# Patient Record
Sex: FEMALE | Race: WHITE | NOT HISPANIC OR LATINO | Employment: OTHER | URBAN - NONMETROPOLITAN AREA
[De-identification: names, ages, dates, MRNs, and addresses within clinical notes are randomized per-mention and may not be internally consistent; named-entity substitution may affect disease eponyms.]

---

## 2019-01-18 ENCOUNTER — HOSPITAL ENCOUNTER (EMERGENCY)
Facility: HOSPITAL | Age: 54
Discharge: HOME | End: 2019-01-19
Attending: EMERGENCY MEDICINE | Admitting: EMERGENCY MEDICINE

## 2019-01-18 ENCOUNTER — APPOINTMENT (OUTPATIENT)
Dept: GENERAL RADIOLOGY | Facility: HOSPITAL | Age: 54
End: 2019-01-18

## 2019-01-18 DIAGNOSIS — J18.9 PNEUMONIA OF RIGHT MIDDLE LOBE DUE TO INFECTIOUS ORGANISM: Primary | ICD-10-CM

## 2019-01-18 PROCEDURE — 93005 ELECTROCARDIOGRAM TRACING: CPT | Performed by: EMERGENCY MEDICINE

## 2019-01-18 PROCEDURE — 71046 X-RAY EXAM CHEST 2 VIEWS: CPT

## 2019-01-18 PROCEDURE — 93010 ELECTROCARDIOGRAM REPORT: CPT | Performed by: INTERNAL MEDICINE

## 2019-01-18 PROCEDURE — 99284 EMERGENCY DEPT VISIT MOD MDM: CPT

## 2019-01-18 RX ORDER — IPRATROPIUM BROMIDE AND ALBUTEROL SULFATE 2.5; .5 MG/3ML; MG/3ML
3 SOLUTION RESPIRATORY (INHALATION) ONCE
Status: COMPLETED | OUTPATIENT
Start: 2019-01-18 | End: 2019-01-19

## 2019-01-18 RX ORDER — ATORVASTATIN CALCIUM 20 MG/1
20 TABLET, FILM COATED ORAL DAILY
COMMUNITY
End: 2022-03-08 | Stop reason: DRUGHIGH

## 2019-01-18 RX ORDER — ASPIRIN 81 MG/1
81 TABLET, CHEWABLE ORAL DAILY
COMMUNITY

## 2019-01-18 RX ORDER — SODIUM CHLORIDE 0.9 % (FLUSH) 0.9 %
10 SYRINGE (ML) INJECTION AS NEEDED
Status: DISCONTINUED | OUTPATIENT
Start: 2019-01-18 | End: 2019-01-19 | Stop reason: HOSPADM

## 2019-01-18 RX ORDER — ISOSORBIDE MONONITRATE 30 MG/1
30 TABLET, EXTENDED RELEASE ORAL DAILY
COMMUNITY
Start: 2018-11-26 | End: 2019-11-27

## 2019-01-18 RX ORDER — ENALAPRIL MALEATE 2.5 MG/1
2.5 TABLET ORAL DAILY
COMMUNITY
Start: 2013-01-08 | End: 2021-02-04

## 2019-01-18 RX ORDER — METHYLPREDNISOLONE SODIUM SUCCINATE 125 MG/2ML
125 INJECTION, POWDER, LYOPHILIZED, FOR SOLUTION INTRAMUSCULAR; INTRAVENOUS ONCE
Status: COMPLETED | OUTPATIENT
Start: 2019-01-18 | End: 2019-01-19

## 2019-01-19 VITALS
OXYGEN SATURATION: 95 % | DIASTOLIC BLOOD PRESSURE: 74 MMHG | TEMPERATURE: 98.4 F | WEIGHT: 80.3 LBS | BODY MASS INDEX: 14.78 KG/M2 | SYSTOLIC BLOOD PRESSURE: 138 MMHG | HEART RATE: 88 BPM | HEIGHT: 62 IN | RESPIRATION RATE: 20 BRPM

## 2019-01-19 LAB
ALBUMIN SERPL-MCNC: 3.9 G/DL (ref 3.4–4.8)
ALBUMIN/GLOB SERPL: 1.6 G/DL (ref 1.1–1.8)
ALP SERPL-CCNC: 130 U/L (ref 38–126)
ALT SERPL W P-5'-P-CCNC: 23 U/L (ref 9–52)
ANION GAP SERPL CALCULATED.3IONS-SCNC: 4 MMOL/L (ref 5–15)
AST SERPL-CCNC: 27 U/L (ref 14–36)
BASOPHILS # BLD AUTO: 0.02 10*3/MM3 (ref 0–0.2)
BASOPHILS NFR BLD AUTO: 0.2 % (ref 0–2)
BILIRUB SERPL-MCNC: 0.9 MG/DL (ref 0.2–1.3)
BUN BLD-MCNC: 5 MG/DL (ref 7–21)
BUN/CREAT SERPL: 11.4 (ref 7–25)
CALCIUM SPEC-SCNC: 9.4 MG/DL (ref 8.4–10.2)
CHLORIDE SERPL-SCNC: 103 MMOL/L (ref 95–110)
CO2 SERPL-SCNC: 24 MMOL/L (ref 22–31)
CREAT BLD-MCNC: 0.44 MG/DL (ref 0.5–1)
DEPRECATED RDW RBC AUTO: 41.7 FL (ref 36.4–46.3)
EOSINOPHIL # BLD AUTO: 0.03 10*3/MM3 (ref 0–0.7)
EOSINOPHIL NFR BLD AUTO: 0.2 % (ref 0–7)
ERYTHROCYTE [DISTWIDTH] IN BLOOD BY AUTOMATED COUNT: 12.3 % (ref 11.5–14.5)
FLUAV AG NPH QL: NEGATIVE
FLUBV AG NPH QL IA: NEGATIVE
GFR SERPL CREATININE-BSD FRML MDRD: 150 ML/MIN/1.73 (ref 51–120)
GLOBULIN UR ELPH-MCNC: 2.5 GM/DL (ref 2.3–3.5)
GLUCOSE BLD-MCNC: 105 MG/DL (ref 60–100)
HCT VFR BLD AUTO: 36.3 % (ref 35–45)
HGB BLD-MCNC: 12.4 G/DL (ref 12–15.5)
HOLD SPECIMEN: NORMAL
HOLD SPECIMEN: NORMAL
IMM GRANULOCYTES # BLD AUTO: 0.04 10*3/MM3 (ref 0–0.02)
IMM GRANULOCYTES NFR BLD AUTO: 0.3 % (ref 0–0.5)
LYMPHOCYTES # BLD AUTO: 2.3 10*3/MM3 (ref 0.6–4.2)
LYMPHOCYTES NFR BLD AUTO: 17.3 % (ref 10–50)
MCH RBC QN AUTO: 31.6 PG (ref 26.5–34)
MCHC RBC AUTO-ENTMCNC: 34.2 G/DL (ref 31.4–36)
MCV RBC AUTO: 92.6 FL (ref 80–98)
MONOCYTES # BLD AUTO: 0.88 10*3/MM3 (ref 0–0.9)
MONOCYTES NFR BLD AUTO: 6.6 % (ref 0–12)
NEUTROPHILS # BLD AUTO: 10 10*3/MM3 (ref 2–8.6)
NEUTROPHILS NFR BLD AUTO: 75.4 % (ref 37–80)
NT-PROBNP SERPL-MCNC: 1110 PG/ML (ref 0–900)
PLATELET # BLD AUTO: 167 10*3/MM3 (ref 150–450)
PMV BLD AUTO: 10.3 FL (ref 8–12)
POTASSIUM BLD-SCNC: 3 MMOL/L (ref 3.5–5.1)
PROT SERPL-MCNC: 6.4 G/DL (ref 6.3–8.6)
RBC # BLD AUTO: 3.92 10*6/MM3 (ref 3.77–5.16)
SODIUM BLD-SCNC: 131 MMOL/L (ref 137–145)
TROPONIN I SERPL-MCNC: <0.012 NG/ML
WBC NRBC COR # BLD: 13.27 10*3/MM3 (ref 3.2–9.8)
WHOLE BLOOD HOLD SPECIMEN: NORMAL
WHOLE BLOOD HOLD SPECIMEN: NORMAL

## 2019-01-19 PROCEDURE — 85025 COMPLETE CBC W/AUTO DIFF WBC: CPT | Performed by: EMERGENCY MEDICINE

## 2019-01-19 PROCEDURE — 87804 INFLUENZA ASSAY W/OPTIC: CPT | Performed by: EMERGENCY MEDICINE

## 2019-01-19 PROCEDURE — 94640 AIRWAY INHALATION TREATMENT: CPT

## 2019-01-19 PROCEDURE — 94760 N-INVAS EAR/PLS OXIMETRY 1: CPT

## 2019-01-19 PROCEDURE — 87040 BLOOD CULTURE FOR BACTERIA: CPT | Performed by: EMERGENCY MEDICINE

## 2019-01-19 PROCEDURE — 25010000002 LEVOFLOXACIN PER 250 MG: Performed by: EMERGENCY MEDICINE

## 2019-01-19 PROCEDURE — 83880 ASSAY OF NATRIURETIC PEPTIDE: CPT | Performed by: EMERGENCY MEDICINE

## 2019-01-19 PROCEDURE — 96365 THER/PROPH/DIAG IV INF INIT: CPT

## 2019-01-19 PROCEDURE — 84484 ASSAY OF TROPONIN QUANT: CPT | Performed by: EMERGENCY MEDICINE

## 2019-01-19 PROCEDURE — 80053 COMPREHEN METABOLIC PANEL: CPT | Performed by: EMERGENCY MEDICINE

## 2019-01-19 PROCEDURE — 25010000002 METHYLPREDNISOLONE PER 125 MG: Performed by: EMERGENCY MEDICINE

## 2019-01-19 PROCEDURE — 96375 TX/PRO/DX INJ NEW DRUG ADDON: CPT

## 2019-01-19 RX ORDER — LEVOFLOXACIN 5 MG/ML
500 INJECTION, SOLUTION INTRAVENOUS ONCE
Status: COMPLETED | OUTPATIENT
Start: 2019-01-19 | End: 2019-01-19

## 2019-01-19 RX ORDER — POTASSIUM CHLORIDE 750 MG/1
20 CAPSULE, EXTENDED RELEASE ORAL ONCE
Status: COMPLETED | OUTPATIENT
Start: 2019-01-19 | End: 2019-01-19

## 2019-01-19 RX ORDER — LEVOFLOXACIN 500 MG/1
500 TABLET, FILM COATED ORAL DAILY
Qty: 10 TABLET | Refills: 0 | Status: SHIPPED | OUTPATIENT
Start: 2019-01-19 | End: 2019-01-29

## 2019-01-19 RX ADMIN — METHYLPREDNISOLONE SODIUM SUCCINATE 125 MG: 125 INJECTION, POWDER, FOR SOLUTION INTRAMUSCULAR; INTRAVENOUS at 00:25

## 2019-01-19 RX ADMIN — SODIUM CHLORIDE, PRESERVATIVE FREE 10 ML: 5 INJECTION INTRAVENOUS at 03:55

## 2019-01-19 RX ADMIN — IPRATROPIUM BROMIDE AND ALBUTEROL SULFATE 3 ML: 2.5; .5 SOLUTION RESPIRATORY (INHALATION) at 00:24

## 2019-01-19 RX ADMIN — LEVOFLOXACIN 500 MG: 5 INJECTION, SOLUTION INTRAVENOUS at 02:47

## 2019-01-19 RX ADMIN — POTASSIUM CHLORIDE 20 MEQ: 750 CAPSULE, EXTENDED RELEASE ORAL at 02:44

## 2019-01-19 NOTE — ED PROVIDER NOTES
"Subjective   53-year-old white female presents to the emergency department with chief complaint of cough chest pain and shortness of breath.  Patient relates she's had a cough productive of green phlegm for 2 months.  She complains of pain in her chest and her back when she coughs.  She feels short of breath.  Patient states \"I feel bad all over.\"            Review of Systems   Constitutional: Positive for chills, diaphoresis and fever.   HENT: Positive for congestion.    Respiratory: Positive for cough and shortness of breath.    Cardiovascular: Positive for chest pain. Negative for leg swelling.   Gastrointestinal: Positive for abdominal pain, nausea and vomiting. Negative for diarrhea.   Genitourinary: Negative for dysuria.   Musculoskeletal: Positive for arthralgias, back pain and myalgias. Negative for neck pain and neck stiffness.   Neurological: Positive for headaches. Negative for syncope, weakness and numbness.   All other systems reviewed and are negative.      Past Medical History:   Diagnosis Date   • COPD (chronic obstructive pulmonary disease) (CMS/[facility])    • Coronary artery disease    • Hypertension        Allergies   Allergen Reactions   • Doxycycline Swelling     Face and hands swell   • Penicillins Swelling     Facial and throat swelling       Past Surgical History:   Procedure Laterality Date   • CORONARY ANGIOPLASTY WITH STENT PLACEMENT     • NASAL SEPTUM SURGERY         History reviewed. No pertinent family history.    Social History     Socioeconomic History   • Marital status:      Spouse name: Not on file   • Number of children: Not on file   • Years of education: Not on file   • Highest education level: Not on file   Tobacco Use   • Smoking status: Current Every Day Smoker     Packs/day: 2.00     Types: Cigarettes   • Smokeless tobacco: Never Used   • Tobacco comment: e cig use    Substance and Sexual Activity   • Alcohol use: No     Frequency: Never   • Drug use: No   • Sexual " activity: Defer           Objective   Physical Exam   Constitutional: She is oriented to person, place, and time. No distress.   HENT:   Head: Normocephalic and atraumatic.   Right Ear: External ear normal.   Left Ear: External ear normal.   Nose: Nose normal.   Mouth/Throat: Oropharynx is clear and moist.   Eyes: Conjunctivae and EOM are normal. Pupils are equal, round, and reactive to light.   Neck: Normal range of motion. Neck supple.   Cardiovascular: Normal rate, regular rhythm, normal heart sounds and intact distal pulses.   Pulmonary/Chest: Effort normal. She has wheezes. She exhibits tenderness.   Abdominal: Soft. Bowel sounds are normal. She exhibits no distension and no mass. There is no tenderness. There is no guarding.   Musculoskeletal: Normal range of motion. She exhibits no edema or tenderness.   Neurological: She is alert and oriented to person, place, and time. No cranial nerve deficit or sensory deficit. She exhibits normal muscle tone.   Skin: Skin is warm and dry. She is not diaphoretic.   Psychiatric: She has a normal mood and affect. Her behavior is normal.   Nursing note and vitals reviewed.      ECG 12 Lead    Date/Time: 1/18/2019 11:55 PM  Performed by: Jamie Ramsey MD  Authorized by: Jamie Ramsey MD   Interpreted by physician  Comments: Normal sinus rhythm rate of 77 with short RI interval.  LVH.  Nonspecific findings.  Appears similar to EKG from January 16, 2017.                 ED Course  ED Course as of Jan 19 0157   Sat Jan 19, 2019   0154 Patient is alert and resting comfortably in no acute distress.  She feels better after treatment.  Patient prefers outpatient management of pneumonia.  I reviewed the results of her evaluation with her and explained the importance of primary care follow-up.  Patient relates she has a nebulizer at home.  Patient was advised to return to the emergency department if her symptoms change or worsen.  [DR]      ED Course User Index  [DR] Jamie Ramsey MD       Labs Reviewed   COMPREHENSIVE METABOLIC PANEL - Abnormal; Notable for the following components:       Result Value    Glucose 105 (*)     BUN 5 (*)     Creatinine 0.44 (*)     Sodium 131 (*)     Potassium 3.0 (*)     Alkaline Phosphatase 130 (*)     eGFR Non  Amer 150 (*)     Anion Gap 4.0 (*)     All other components within normal limits   BNP (IN-HOUSE) - Abnormal; Notable for the following components:    proBNP 1,110.0 (*)     All other components within normal limits   CBC WITH AUTO DIFFERENTIAL - Abnormal; Notable for the following components:    WBC 13.27 (*)     Neutrophils, Absolute 10.00 (*)     Immature Grans, Absolute 0.04 (*)     All other components within normal limits   INFLUENZA ANTIGEN, RAPID - Normal   TROPONIN (IN-HOUSE) - Normal   BLOOD CULTURE   BLOOD CULTURE   RAINBOW DRAW    Narrative:     The following orders were created for panel order [facility] Draw.  Procedure                               Abnormality         Status                     ---------                               -----------         ------                     Light Blue Top[[id number]]                                   Final result               Green Top (Gel)[[id number]]                                  Final result               Lavender Top[[id number]]                                     Final result               Gold Top - SST[[id number]]                                   Final result                 Please view results for these tests on the individual orders.   CBC AND DIFFERENTIAL    Narrative:     The following orders were created for panel order CBC & Differential.  Procedure                               Abnormality         Status                     ---------                               -----------         ------                     CBC Auto Differential[[id number]]        Abnormal            Final result                 Please view results for these tests on the individual orders.   LIGHT BLUE TOP   GREEN  TOP   LAVENDER TOP   GOLD TOP - SST     Xr Chest 2 View    Result Date: 1/19/2019  Narrative: Chest 2 view on  1/18/2019 CLINICAL INDICATION: Shortness of breath COMPARISON: 1/16/2017 FINDINGS: Mild emphysematous changes of the lungs are noted. There is patchy right middle lobe opacity suggesting early pneumonia. The lungs are otherwise clear. Vascular calcification is noted in the aorta. Cardiac, hilar and mediastinal contours are within normal limits. Pulmonary vascularity is within normal limits.     Impression: Findings suggesting early right middle lobe pneumonia. Electronically signed by:  Dev Ruiz  1/19/2019 12:00 AM [facility] Workstation: [id number]                [facility]      Final diagnoses:   Pneumonia of right middle lobe due to infectious organism (CMS/[facility])            Jamie Ramsey MD  01/19/19 0157

## 2019-01-24 LAB
BACTERIA SPEC AEROBE CULT: NORMAL
BACTERIA SPEC AEROBE CULT: NORMAL

## 2020-04-06 ENCOUNTER — TRANSCRIBE ORDERS (OUTPATIENT)
Dept: PODIATRY | Facility: CLINIC | Age: 55
End: 2020-04-06

## 2020-04-06 DIAGNOSIS — M79.673 PAIN OF FOOT, UNSPECIFIED LATERALITY: Primary | ICD-10-CM

## 2020-04-06 DIAGNOSIS — S92.502S CLOSED FRACTURE OF PHALANX OF LEFT FIFTH TOE, SEQUELA: ICD-10-CM

## 2020-07-14 ENCOUNTER — HOSPITAL ENCOUNTER (OUTPATIENT)
Dept: CT IMAGING | Facility: HOSPITAL | Age: 55
Discharge: HOME | End: 2020-07-14
Admitting: NURSE PRACTITIONER

## 2020-07-14 DIAGNOSIS — F17.210 CIGARETTE SMOKER: ICD-10-CM

## 2020-07-14 DIAGNOSIS — Z87.891 PERSONAL HISTORY OF TOBACCO USE, PRESENTING HAZARDS TO HEALTH: ICD-10-CM

## 2020-07-14 PROCEDURE — G0297 LDCT FOR LUNG CA SCREEN: HCPCS

## 2020-09-23 ENCOUNTER — TRANSCRIBE ORDERS (OUTPATIENT)
Dept: ORTHOPEDIC SURGERY | Facility: CLINIC | Age: 55
End: 2020-09-23

## 2020-09-23 DIAGNOSIS — M81.8 ADULT IDIOPATHIC GENERALIZED OSTEOPOROSIS: Primary | ICD-10-CM

## 2020-09-28 ENCOUNTER — OFFICE VISIT (OUTPATIENT)
Dept: ORTHOPEDIC SURGERY | Facility: CLINIC | Age: 55
End: 2020-09-28

## 2020-09-28 VITALS — HEIGHT: 62 IN | WEIGHT: 86 LBS | BODY MASS INDEX: 15.83 KG/M2

## 2020-09-28 DIAGNOSIS — Z72.0 TOBACCO ABUSE: ICD-10-CM

## 2020-09-28 DIAGNOSIS — M81.0 AGE-RELATED OSTEOPOROSIS WITHOUT CURRENT PATHOLOGICAL FRACTURE: Primary | ICD-10-CM

## 2020-09-28 DIAGNOSIS — E28.319 EARLY MENOPAUSE OCCURRING IN PATIENT AGE YOUNGER THAN 45 YEARS: ICD-10-CM

## 2020-09-28 DIAGNOSIS — Z78.0 POSTMENOPAUSAL: ICD-10-CM

## 2020-09-28 DIAGNOSIS — R63.6 UNDERWEIGHT: ICD-10-CM

## 2020-09-28 DIAGNOSIS — J44.9 CHRONIC OBSTRUCTIVE PULMONARY DISEASE, UNSPECIFIED COPD TYPE (HCC): ICD-10-CM

## 2020-09-28 PROCEDURE — 99214 OFFICE O/P EST MOD 30 MIN: CPT | Performed by: NURSE PRACTITIONER

## 2020-09-28 NOTE — PROGRESS NOTES
"Muna Zayas is a 55 y.o. female returns for     Chief Complaint   Patient presents with   • Osteoporosis       HISTORY OF PRESENT ILLNESS: 55-year-old  female patient [facility] and treatment of osteoporosis.    Last Bone Density Study: 7/14/2020    Referred by: FREDI Jackson    History of Osteoporosis or Osteopenia: Yes, osteoporosis  Prior Treatments for Osteoporosis: Fosamax 10 mg daily.  Patient has stopped taking this as she was not tolerating it well at all.  She had experienced reflux, upset stomach and ulcers in her mouth.  She only took the Fosamax for approximately one month and then stopped it due to adverse side effects.    Decrease in Height: No    Fracture History: Great toe fracture due to striking it on an inanimate object, multiple other minor toe fractures.  No stress fractures or fragility fractures reported.    High Risk Medications:   Current: None   Previous: None known.    Comorbid Medical Conditions that Increase Risk for Osteoporosis: Underweight/low BMI, COPD, long-term tobacco abuse    Postmenopausal status: Postmenopausal  Age of Menopause/Hysterectomy: She is unsure, \"a long time ago\", possibly in her early 40s.   Hormone Replacement Therapy: No    Family History of Osteoporosis: No, not that she is aware of.    History of Smoking: Yes, history of heavy smoking.  Patient has smoked 2 packs/day for the past 40 years.  Patient was recently prescribed Chantix by her PCP and states she has cut back on smoking.  She states she recently had 1 pack last 2 days.  She is motivated to quit smoking.    Taking Calcium supplements: Yes, taking 1000 mg of calcium daily.  Patient reports minimal dairy intake in her diet.  She states she believes she may be lactose intolerant and even had to have soy milk as an infant.  Taking Vitamin D supplements: Yes, taking 800 units of vitamin D daily.  No recent vitamin D levels noted in " "review of her chart.    Activity Level: No regular exercise but is active.  [place]  She understands she is high risk for fracture and voices fear of falling.       CONCURRENT MEDICAL HISTORY:    The following portions of the patient's history were reviewed and updated as appropriate: allergies, current medications, past family history, past medical history, past social history, past surgical history and problem list.     ROS  No fevers or chills.  No chest pain or shortness of air.  No GI or  disturbances.    PHYSICAL EXAMINATION:       Ht 157.5 cm (62\")   Wt 39 kg (86 lb)   BMI 15.73 kg/m²     Physical Exam  Vitals signs reviewed.   Constitutional:       General: She is not in acute distress.     Appearance: She is well-developed and underweight. She is not ill-appearing.   HENT:      Head: Normocephalic.   Pulmonary:      Effort: Pulmonary effort is normal. No respiratory distress.   Abdominal:      General: There is no distension.      Palpations: Abdomen is soft.   Musculoskeletal:         General: Tenderness (Mild, lumbar spine) present. No swelling.   Skin:     General: Skin is warm and dry.      Capillary Refill: Capillary refill takes less than 2 seconds.      Findings: No erythema.   Neurological:      Mental Status: She is alert and oriented to person, place, and time.      GCS: GCS eye subscore is 4. GCS verbal subscore is 5. GCS motor subscore is 6.   Psychiatric:         Speech: Speech normal.         Behavior: Behavior normal.         Thought Content: Thought content normal.         Judgment: Judgment normal.         GAIT:     [x]  Normal  []  Antalgic    Assistive device: [x]  None  []  Walker     []  Crutches  []  Cane     []  Wheelchair  []  Stretcher    Back Exam     Tenderness   The patient is experiencing tenderness in the lumbar (Mild).    Range of Motion   The patient has normal back ROM.    Muscle Strength   The patient has normal back " strength.    Reflexes   Patellar: normal    Other   Sensation: normal  Gait: normal   Erythema: no back redness    Comments:  Mild pain and tenderness of the lumbar spine, which is a chronic finding for this patient.  No kyphosis or deformity noted.  No focal neurological deficits noted.            DEXA scan, bone density study.      CLINICAL INDICATION: Osteoporosis screening evaluation      COMPARISON: Information not available     PROCEDURE/TECHNIQUE:  Multiple images of the left hip and lumbar spine were obtained  using dual absorption technique.     FINDINGS:  The exam is performed using the Hologic horizon C unit.  Images  are of satisfactory quality.     Total Lumbar spine:   0.655  gm/cm2     T-Score -3.6         Femoral neck Left Hip:            0.508     gm/cm2     T-Score  -3.4         IMPRESSION:  This patient has bone density parameters in both the  lumbar spine and left hip in the rimma osteoporosis category.  Patients with bone density parameters in this range have a high  lifetime risk of fracture at involved sites.        WORLD HEALTH ORGANIZATION CRITERIA FOR OSTEOPOROSIS     DIAGNOSTIC CATEGORY    T-SCORE  Normal......................................................0 to  - 1.0  Osteopenia..............................................-1.0 to  -2.5  Osteoporosis...........................................Greater  than -2.5 (no history of fragility fractures)*  Established Osteoporosis........................Greater than -2.5  (with history of fragility fractures)*     *FRAGILITY FRACTURES: VERTEBRAE, HUMERUS, WRIST, HIP (OVER 40  YEARS OF AGE)     Electronically signed by:  Juwan Moffett MD  7/14/2020 2:26 PM CDT  Workstation: [id number]        ASSESSMENT:    Diagnoses and all orders for this visit:    Age-related osteoporosis without current pathological fracture  -     Vitamin D 25 Hydroxy; Future    Postmenopausal    Tobacco abuse    Chronic obstructive pulmonary disease, unspecified COPD type  (CMS/[facility])    Underweight    Early menopause occurring in patient age younger than 45 years    Other orders  -     CALCIUM-VITAMIN D PO; Take  by mouth.    PLAN    Bone density study results from 7/14/2020 reviewed and discussed with patient.  We discussed her diagnosis of osteoporosis, which is a new diagnosis for her.  We discussed her T-scores and that she has rimma osteoporotic readings in both her spine and left hip.  She has a T score of -3.6 in the lumbar spine and a T score of -3.4 in the left hip.  We discussed that she is high risk for fracture.  We discussed her individualized risk factors for osteoporosis including her age, race, gender, postmenopausal status, long history of heavy smoking, small build/frame, low body weight and comorbid medical conditions including COPD.  She was started on Fosamax for treatment of her osteoporosis but she is not tolerating this medication well and has experienced adverse GI side effects including severe reflux, upset stomach and ulcerations in her mouth.  She only took the medication for approximately one month and then she discontinued it on her own.  Currently, she is without treatment.  I have recommended an injectable treatment of osteoporosis since she has tried and failed oral biphosphonate therapy.  We discussed her options including Prolia, Forteo, Tymlos and Evenity.  Patient is adamant that she does not want to do daily self injections.  I have recommended that she start Prolia for treatment of her osteoporosis in an effort to prevent further bone loss, prevent worsening osteoporosis and reduce her risk for fracture.  Patient is in agreement with this and wants to proceed.  She understands she will receive a telephone call to schedule her initial injection once it has been approved by her insurance.  We discussed risk versus benefits of starting this medication.  We discussed the risk for osteonecrosis of the jaw, which is a rare complication associated with  "osteoporosis treatment.  We discussed signs/symptoms of osteonecrosis of the jaw.  I have recommended that the patient have regular dental checkups every 6 months and make her dentist aware that she is on osteoporosis medication.  We discussed the importance of proper nutrition and adequate intake of calcium and vitamin D for optimal bone health and prevention of worsening osteoporosis.  Patient is noted to be quite underweight.  She has no documented history of malabsorption syndrome or any medical conditions that would cause her to be underweight.  We discussed that her low body weight increases her risk for osteoporosis.  Recommend to continue with her calcium plus vitamin D supplement twice daily as recently prescribed per her PCP.  Patient needs a vitamin D level checked as she may need a higher dose of vitamin D supplementation.  I will place this order today and it can be drawn with her labs at the [facility] prior to her initial injection of Prolia.  We also discussed the importance of regular exercise, especially weightbearing exercise such as walking, for optimal bone health and prevention of worsening osteoporosis. An educational packet regarding osteoporosis is provided to the patient today.  Written literature regarding Prolia is provided to the patient today.  Patient has decreased her smoking.  She has a history of smoking 2 packs/day for the past 40 years.  She states she has recently made 1 pack of cigarettes last for 2 days.  She was recently prescribed Chantix by her PCP and continues to take this medication.  She is motivated to quit smoking and states that it is \"killing her\" and she \"cannot breathe\".  We discussed that smoking cessation is imperative for her overall health, especially her pulmonary health, but will be quite beneficial for prevention of worsening osteoporosis as well.  Patient verbalized understanding.  Recommend a repeat bone density study in 1 year and follow-up with [facility].    Return in about 1 year (around 9/28/2021) for Recheck.      This document has been electronically signed by FREDI Villegas on November 2, 2020 11:02 CST      FREDI Villegas

## 2020-10-19 ENCOUNTER — TELEPHONE (OUTPATIENT)
Dept: ORTHOPEDIC SURGERY | Facility: CLINIC | Age: 55
End: 2020-10-19

## 2020-10-19 NOTE — TELEPHONE ENCOUNTER
Called and spoke with eleazar at [facility], case is still pending.     Gave info to patient.

## 2020-11-02 ENCOUNTER — RESULTS ENCOUNTER (OUTPATIENT)
Dept: ONCOLOGY | Facility: HOSPITAL | Age: 55
End: 2020-11-02

## 2020-11-02 DIAGNOSIS — M81.0 SENILE OSTEOPOROSIS: Primary | ICD-10-CM

## 2020-11-02 DIAGNOSIS — M81.0 SENILE OSTEOPOROSIS: ICD-10-CM

## 2020-11-04 ENCOUNTER — TELEPHONE (OUTPATIENT)
Dept: ORTHOPEDIC SURGERY | Facility: CLINIC | Age: 55
End: 2020-11-04

## 2020-11-04 NOTE — TELEPHONE ENCOUNTER
----- Message from Ramiro Medina sent at 11/3/2020 12:59 PM CST -----  PATIENT IS CALLING ASKING ABOUT HER PROLIA    -RAMIRO      Called patient to let her know insurance  Has approved her  Prolia, referral sent to [facility] for appt. 11/4/2020

## 2020-12-07 ENCOUNTER — TELEPHONE (OUTPATIENT)
Dept: ORTHOPEDIC SURGERY | Facility: CLINIC | Age: 55
End: 2020-12-07

## 2020-12-07 NOTE — TELEPHONE ENCOUNTER
Patient would like to change dates of her prolia. When she called the [facility] center they said she had to change it with us?

## 2020-12-14 ENCOUNTER — LAB (OUTPATIENT)
Dept: ONCOLOGY | Facility: HOSPITAL | Age: 55
End: 2020-12-14

## 2020-12-14 DIAGNOSIS — M81.0 SENILE OSTEOPOROSIS: ICD-10-CM

## 2020-12-14 LAB
ALBUMIN SERPL-MCNC: 4 G/DL (ref 3.5–5.2)
ALBUMIN/GLOB SERPL: 1.4 G/DL
ALP SERPL-CCNC: 123 U/L (ref 39–117)
ALT SERPL W P-5'-P-CCNC: 24 U/L (ref 1–33)
ANION GAP SERPL CALCULATED.3IONS-SCNC: 10 MMOL/L (ref 5–15)
AST SERPL-CCNC: 28 U/L (ref 1–32)
BASOPHILS # BLD AUTO: 0.05 10*3/MM3 (ref 0–0.2)
BASOPHILS NFR BLD AUTO: 0.6 % (ref 0–1.5)
BILIRUB SERPL-MCNC: 0.5 MG/DL (ref 0–1.2)
BUN SERPL-MCNC: 8 MG/DL (ref 6–20)
BUN/CREAT SERPL: 16.7 (ref 7–25)
CALCIUM SPEC-SCNC: 9.1 MG/DL (ref 8.6–10.5)
CHLORIDE SERPL-SCNC: 106 MMOL/L (ref 98–107)
CO2 SERPL-SCNC: 24 MMOL/L (ref 22–29)
CREAT SERPL-MCNC: 0.48 MG/DL (ref 0.57–1)
DEPRECATED RDW RBC AUTO: 42.6 FL (ref 37–54)
EOSINOPHIL # BLD AUTO: 0.11 10*3/MM3 (ref 0–0.4)
EOSINOPHIL NFR BLD AUTO: 1.3 % (ref 0.3–6.2)
ERYTHROCYTE [DISTWIDTH] IN BLOOD BY AUTOMATED COUNT: 12.1 % (ref 12.3–15.4)
GFR SERPL CREATININE-BSD FRML MDRD: 134 ML/MIN/1.73
GLOBULIN UR ELPH-MCNC: 2.8 GM/DL
GLUCOSE SERPL-MCNC: 124 MG/DL (ref 65–99)
HCT VFR BLD AUTO: 39.7 % (ref 34–46.6)
HGB BLD-MCNC: 13.5 G/DL (ref 12–15.9)
IMM GRANULOCYTES # BLD AUTO: 0.03 10*3/MM3 (ref 0–0.05)
IMM GRANULOCYTES NFR BLD AUTO: 0.4 % (ref 0–0.5)
LYMPHOCYTES # BLD AUTO: 2.71 10*3/MM3 (ref 0.7–3.1)
LYMPHOCYTES NFR BLD AUTO: 31.7 % (ref 19.6–45.3)
MAGNESIUM SERPL-MCNC: 1.5 MG/DL (ref 1.6–2.6)
MCH RBC QN AUTO: 32.8 PG (ref 26.6–33)
MCHC RBC AUTO-ENTMCNC: 34 G/DL (ref 31.5–35.7)
MCV RBC AUTO: 96.6 FL (ref 79–97)
MONOCYTES # BLD AUTO: 0.68 10*3/MM3 (ref 0.1–0.9)
MONOCYTES NFR BLD AUTO: 8 % (ref 5–12)
NEUTROPHILS NFR BLD AUTO: 4.97 10*3/MM3 (ref 1.7–7)
NEUTROPHILS NFR BLD AUTO: 58 % (ref 42.7–76)
NRBC BLD AUTO-RTO: 0 /100 WBC (ref 0–0.2)
PHOSPHATE SERPL-MCNC: 3.6 MG/DL (ref 2.5–4.5)
PLATELET # BLD AUTO: 216 10*3/MM3 (ref 140–450)
PMV BLD AUTO: 9.5 FL (ref 6–12)
POTASSIUM SERPL-SCNC: 3.7 MMOL/L (ref 3.5–5.2)
PROT SERPL-MCNC: 6.8 G/DL (ref 6–8.5)
RBC # BLD AUTO: 4.11 10*6/MM3 (ref 3.77–5.28)
SODIUM SERPL-SCNC: 140 MMOL/L (ref 136–145)
WBC # BLD AUTO: 8.55 10*3/MM3 (ref 3.4–10.8)

## 2020-12-14 PROCEDURE — 36415 COLL VENOUS BLD VENIPUNCTURE: CPT | Performed by: NURSE PRACTITIONER

## 2020-12-14 PROCEDURE — 84100 ASSAY OF PHOSPHORUS: CPT

## 2020-12-14 PROCEDURE — 80053 COMPREHEN METABOLIC PANEL: CPT

## 2020-12-14 PROCEDURE — 83735 ASSAY OF MAGNESIUM: CPT

## 2020-12-14 PROCEDURE — 85025 COMPLETE CBC W/AUTO DIFF WBC: CPT

## 2020-12-17 ENCOUNTER — INFUSION (OUTPATIENT)
Dept: ONCOLOGY | Facility: HOSPITAL | Age: 55
End: 2020-12-17

## 2020-12-17 VITALS — DIASTOLIC BLOOD PRESSURE: 89 MMHG | HEART RATE: 90 BPM | RESPIRATION RATE: 18 BRPM | SYSTOLIC BLOOD PRESSURE: 143 MMHG

## 2020-12-17 DIAGNOSIS — M81.0 SENILE OSTEOPOROSIS: Primary | ICD-10-CM

## 2020-12-17 PROCEDURE — 96372 THER/PROPH/DIAG INJ SC/IM: CPT | Performed by: NURSE PRACTITIONER

## 2020-12-17 PROCEDURE — 25010000002 DENOSUMAB 60 MG/ML SOLUTION PREFILLED SYRINGE: Performed by: NURSE PRACTITIONER

## 2020-12-17 RX ADMIN — DENOSUMAB 60 MG: 60 INJECTION SUBCUTANEOUS at 11:36

## 2020-12-17 NOTE — PATIENT INSTRUCTIONS
Denosumab injection  What is this medicine?  DENOSUMAB (den oh jeffrey mab) slows bone breakdown. Prolia is used to treat osteoporosis in women after menopause and in men, and in people who are taking corticosteroids for 6 months or more. Xgeva is used to treat a high calcium level due to cancer and to prevent bone fractures and other bone problems caused by multiple myeloma or cancer bone metastases. Xgeva is also used to treat giant cell tumor of the bone.  This medicine may be used for other purposes; ask your health care provider or pharmacist if you have questions.  COMMON BRAND NAME(S): Prolia, XGEVA  What should I tell my health care provider before I take this medicine?  They need to know if you have any of these conditions:  · dental disease  · having surgery or tooth extraction  · infection  · kidney disease  · low levels of calcium or Vitamin D in the blood  · malnutrition  · on hemodialysis  · skin conditions or sensitivity  · thyroid or parathyroid disease  · an unusual reaction to denosumab, other medicines, foods, dyes, or preservatives  · pregnant or trying to get pregnant  · breast-feeding  How should I use this medicine?  This medicine is for injection under the skin. It is given by a health care professional in a hospital or clinic setting.  A special MedGuide will be given to you before each treatment. Be sure to read this information carefully each time.  For Prolia, talk to your pediatrician regarding the use of this medicine in children. Special care may be needed. For Xgeva, talk to your pediatrician regarding the use of this medicine in children. While this drug may be prescribed for children as young as 13 years for selected conditions, precautions do apply.  Overdosage: If you think you have taken too much of this medicine contact a poison control center or emergency room at once.  NOTE: This medicine is only for you. Do not share this medicine with others.  What if I miss a dose?  It is  important not to miss your dose. Call your doctor or health care professional if you are unable to keep an appointment.  What may interact with this medicine?  Do not take this medicine with any of the following medications:  · other medicines containing denosumab  This medicine may also interact with the following medications:  · medicines that lower your chance of fighting infection  · steroid medicines like prednisone or cortisone  This list may not describe all possible interactions. Give your health care provider a list of all the medicines, herbs, non-prescription drugs, or dietary supplements you use. Also tell them if you smoke, drink alcohol, or use illegal drugs. Some items may interact with your medicine.  What should I watch for while using this medicine?  Visit your doctor or health care professional for regular checks on your progress. Your doctor or health care professional may order blood tests and other tests to see how you are doing.  Call your doctor or health care professional for advice if you get a fever, chills or sore throat, or other symptoms of a cold or flu. Do not treat yourself. This drug may decrease your body's ability to fight infection. Try to avoid being around people who are sick.  You should make sure you get enough calcium and vitamin D while you are taking this medicine, unless your doctor tells you not to. Discuss the foods you eat and the vitamins you take with your health care professional.  See your dentist regularly. Brush and floss your teeth as directed. Before you have any dental work done, tell your dentist you are receiving this medicine.  Do not become pregnant while taking this medicine or for 5 months after stopping it. Talk with your doctor or health care professional about your birth control options while taking this medicine. Women should inform their doctor if they wish to become pregnant or think they might be pregnant. There is a potential for serious side  effects to an unborn child. Talk to your health care professional or pharmacist for more information.  What side effects may I notice from receiving this medicine?  Side effects that you should report to your doctor or health care professional as soon as possible:  · allergic reactions like skin rash, itching or hives, swelling of the face, lips, or tongue  · bone pain  · breathing problems  · dizziness  · jaw pain, especially after dental work  · redness, blistering, peeling of the skin  · signs and symptoms of infection like fever or chills; cough; sore throat; pain or trouble passing urine  · signs of low calcium like fast heartbeat, muscle cramps or muscle pain; pain, tingling, numbness in the hands or feet; seizures  · unusual bleeding or bruising  · unusually weak or tired  Side effects that usually do not require medical attention (report to your doctor or health care professional if they continue or are bothersome):  · constipation  · diarrhea  · headache  · joint pain  · loss of appetite  · muscle pain  · runny nose  · tiredness  · upset stomach  This list may not describe all possible side effects. Call your doctor for medical advice about side effects. You may report side effects to FDA at 6-207-FDA-2341.  Where should I keep my medicine?  This medicine is only given in a clinic, doctor's office, or other health care setting and will not be stored at home.  NOTE: This sheet is a summary. It may not cover all possible information. If you have questions about this medicine, talk to your doctor, pharmacist, or health care provider.  © 2020 [employer] (2019-04-26 16:10:44)

## 2021-01-27 ENCOUNTER — OFFICE VISIT (OUTPATIENT)
Dept: GASTROENTEROLOGY | Facility: CLINIC | Age: 56
End: 2021-01-27

## 2021-01-27 VITALS
BODY MASS INDEX: 16.41 KG/M2 | WEIGHT: 89.2 LBS | SYSTOLIC BLOOD PRESSURE: 165 MMHG | HEIGHT: 62 IN | HEART RATE: 96 BPM | DIASTOLIC BLOOD PRESSURE: 90 MMHG

## 2021-01-27 DIAGNOSIS — K59.09 OTHER CONSTIPATION: Primary | ICD-10-CM

## 2021-01-27 DIAGNOSIS — R10.84 GENERALIZED ABDOMINAL PAIN: ICD-10-CM

## 2021-01-27 DIAGNOSIS — R63.4 WEIGHT LOSS: ICD-10-CM

## 2021-01-27 DIAGNOSIS — Z80.0 FAMILY HISTORY OF COLON CANCER: ICD-10-CM

## 2021-01-27 DIAGNOSIS — R11.0 NAUSEA: ICD-10-CM

## 2021-01-27 PROCEDURE — 99204 OFFICE O/P NEW MOD 45 MIN: CPT | Performed by: INTERNAL MEDICINE

## 2021-01-27 RX ORDER — OMEPRAZOLE 40 MG/1
40 CAPSULE, DELAYED RELEASE ORAL DAILY
Qty: 30 CAPSULE | Refills: 11 | Status: SHIPPED | OUTPATIENT
Start: 2021-01-27 | End: 2021-02-04

## 2021-01-27 RX ORDER — POLYETHYLENE GLYCOL 3350 17 G/17G
17 POWDER, FOR SOLUTION ORAL DAILY
Qty: 30 PACKET | Refills: 6 | Status: SHIPPED | OUTPATIENT
Start: 2021-01-27 | End: 2021-02-26 | Stop reason: SDUPTHER

## 2021-01-27 RX ORDER — DILTIAZEM HYDROCHLORIDE 240 MG/1
240 CAPSULE, EXTENDED RELEASE ORAL DAILY
COMMUNITY
Start: 2021-01-18 | End: 2022-04-29 | Stop reason: SDUPTHER

## 2021-01-27 RX ORDER — DEXTROSE AND SODIUM CHLORIDE 5; .45 G/100ML; G/100ML
30 INJECTION, SOLUTION INTRAVENOUS CONTINUOUS PRN
Status: CANCELLED | OUTPATIENT
Start: 2021-02-09

## 2021-01-27 RX ORDER — ISOSORBIDE MONONITRATE 30 MG/1
30 TABLET, EXTENDED RELEASE ORAL 2 TIMES DAILY
COMMUNITY
Start: 2021-01-18

## 2021-01-27 RX ORDER — ATORVASTATIN CALCIUM 10 MG/1
TABLET, FILM COATED ORAL
COMMUNITY
Start: 2020-12-17 | End: 2021-01-27 | Stop reason: SDUPTHER

## 2021-02-06 ENCOUNTER — LAB (OUTPATIENT)
Dept: LAB | Facility: HOSPITAL | Age: 56
End: 2021-02-06

## 2021-02-06 DIAGNOSIS — Z01.818 PREOP TESTING: Primary | ICD-10-CM

## 2021-02-06 PROCEDURE — U0004 COV-19 TEST NON-CDC HGH THRU: HCPCS

## 2021-02-06 PROCEDURE — C9803 HOPD COVID-19 SPEC COLLECT: HCPCS

## 2021-02-07 LAB — SARS-COV-2 ORF1AB RESP QL NAA+PROBE: NOT DETECTED

## 2021-02-09 ENCOUNTER — HOSPITAL ENCOUNTER (OUTPATIENT)
Facility: HOSPITAL | Age: 56
Setting detail: HOSPITAL OUTPATIENT SURGERY
Discharge: HOME | End: 2021-02-09
Attending: INTERNAL MEDICINE | Admitting: INTERNAL MEDICINE

## 2021-02-09 ENCOUNTER — ANESTHESIA (OUTPATIENT)
Dept: GASTROENTEROLOGY | Facility: HOSPITAL | Age: 56
End: 2021-02-09

## 2021-02-09 ENCOUNTER — ANESTHESIA EVENT (OUTPATIENT)
Dept: GASTROENTEROLOGY | Facility: HOSPITAL | Age: 56
End: 2021-02-09

## 2021-02-09 VITALS
TEMPERATURE: 98.1 F | RESPIRATION RATE: 18 BRPM | OXYGEN SATURATION: 98 % | SYSTOLIC BLOOD PRESSURE: 140 MMHG | BODY MASS INDEX: 16.38 KG/M2 | DIASTOLIC BLOOD PRESSURE: 70 MMHG | HEIGHT: 62 IN | HEART RATE: 92 BPM | WEIGHT: 89 LBS

## 2021-02-09 DIAGNOSIS — R10.84 GENERALIZED ABDOMINAL PAIN: ICD-10-CM

## 2021-02-09 DIAGNOSIS — R11.0 NAUSEA: ICD-10-CM

## 2021-02-09 DIAGNOSIS — R63.4 WEIGHT LOSS: ICD-10-CM

## 2021-02-09 DIAGNOSIS — K59.09 OTHER CONSTIPATION: ICD-10-CM

## 2021-02-09 DIAGNOSIS — Z80.0 FAMILY HISTORY OF COLON CANCER: ICD-10-CM

## 2021-02-09 PROCEDURE — 25010000002 PROPOFOL 10 MG/ML EMULSION: Performed by: NURSE ANESTHETIST, CERTIFIED REGISTERED

## 2021-02-09 PROCEDURE — 43239 EGD BIOPSY SINGLE/MULTIPLE: CPT | Performed by: INTERNAL MEDICINE

## 2021-02-09 PROCEDURE — 45378 DIAGNOSTIC COLONOSCOPY: CPT | Performed by: INTERNAL MEDICINE

## 2021-02-09 PROCEDURE — 88305 TISSUE EXAM BY PATHOLOGIST: CPT

## 2021-02-09 RX ORDER — LIDOCAINE HYDROCHLORIDE 20 MG/ML
INJECTION, SOLUTION INTRAVENOUS AS NEEDED
Status: DISCONTINUED | OUTPATIENT
Start: 2021-02-09 | End: 2021-02-09 | Stop reason: SURG

## 2021-02-09 RX ORDER — DEXTROSE AND SODIUM CHLORIDE 5; .45 G/100ML; G/100ML
30 INJECTION, SOLUTION INTRAVENOUS CONTINUOUS PRN
Status: DISCONTINUED | OUTPATIENT
Start: 2021-02-09 | End: 2021-02-09 | Stop reason: HOSPADM

## 2021-02-09 RX ORDER — PROPOFOL 10 MG/ML
VIAL (ML) INTRAVENOUS AS NEEDED
Status: DISCONTINUED | OUTPATIENT
Start: 2021-02-09 | End: 2021-02-09 | Stop reason: SURG

## 2021-02-09 RX ADMIN — PROPOFOL 60 MG: 10 INJECTION, EMULSION INTRAVENOUS at 14:16

## 2021-02-09 RX ADMIN — PROPOFOL 30 MG: 10 INJECTION, EMULSION INTRAVENOUS at 14:21

## 2021-02-09 RX ADMIN — LIDOCAINE HYDROCHLORIDE 60 MG: 20 INJECTION, SOLUTION INTRAVENOUS at 14:16

## 2021-02-09 RX ADMIN — DEXTROSE AND SODIUM CHLORIDE 30 ML/HR: 5; 450 INJECTION, SOLUTION INTRAVENOUS at 14:12

## 2021-02-09 RX ADMIN — PROPOFOL 20 MG: 10 INJECTION, EMULSION INTRAVENOUS at 14:17

## 2021-02-09 RX ADMIN — PROPOFOL 50 MG: 10 INJECTION, EMULSION INTRAVENOUS at 14:23

## 2021-02-09 RX ADMIN — PROPOFOL 30 MG: 10 INJECTION, EMULSION INTRAVENOUS at 14:25

## 2021-02-09 RX ADMIN — PROPOFOL 40 MG: 10 INJECTION, EMULSION INTRAVENOUS at 14:19

## 2021-02-09 RX ADMIN — PROPOFOL 30 MG: 10 INJECTION, EMULSION INTRAVENOUS at 14:27

## 2021-02-09 NOTE — ANESTHESIA POSTPROCEDURE EVALUATION
Patient: Muna Zayas    Procedure Summary     Date: 02/09/21 Room / Location: [facility] ENDOSCOPY 1 / [facility] ENDOSCOPY    Anesthesia Start: 1413 Anesthesia Stop: 1430    Procedures:       ESOPHAGOGASTRODUODENOSCOPY (N/A )      COLONOSCOPY (N/A ) Diagnosis:       Other constipation      Generalized abdominal pain      Nausea      Weight loss      Family history of colon cancer      (Other constipation [K59.09])      (Generalized abdominal pain [R10.84])      (Nausea [R11.0])      (Weight loss [R63.4])      (Family history of colon cancer [Z80.0])    Surgeon: Diane Haynes MD Provider: Brittany Rudolph CRNA    Anesthesia Type: MAC ASA Status: 3          Anesthesia Type: MAC    Vitals  No vitals data found for the desired time range.          Post Anesthesia Care and Evaluation      Comments: 139/65 88 18 99%

## 2021-02-09 NOTE — ANESTHESIA PREPROCEDURE EVALUATION
Anesthesia Evaluation                  Airway   Mallampati: II  TM distance: >3 FB  Neck ROM: full  No difficulty expected  Dental    (+) upper dentures    Pulmonary - normal exam   (+) a smoker Current Smoked day of surgery, COPD moderate,   Cardiovascular - normal exam    (+) hypertension well controlled less than 2 medications, CAD,       Neuro/Psych  GI/Hepatic/Renal/Endo      ROS Comment: Weight loss    Musculoskeletal     Abdominal  - normal exam   Substance History      OB/GYN          Other                      Anesthesia Plan    ASA 3     MAC     intravenous induction     Anesthetic plan, all risks, benefits, and alternatives have been provided, discussed and informed consent has been obtained with: patient.

## 2021-02-09 NOTE — ANESTHESIA POSTPROCEDURE EVALUATION
Patient: Muna Zayas    Procedure Summary     Date: 02/09/21 Room / Location: [facility] ENDOSCOPY 1 / [facility] ENDOSCOPY    Anesthesia Start: 1413 Anesthesia Stop: 1430    Procedures:       ESOPHAGOGASTRODUODENOSCOPY (N/A )      COLONOSCOPY (N/A ) Diagnosis:       Other constipation      Generalized abdominal pain      Nausea      Weight loss      Family history of colon cancer      (Other constipation [K59.09])      (Generalized abdominal pain [R10.84])      (Nausea [R11.0])      (Weight loss [R63.4])      (Family history of colon cancer [Z80.0])    Surgeon: Diane Haynes MD Provider: Brittany Rudolph CRNA    Anesthesia Type: MAC ASA Status: 3          Anesthesia Type: MAC    Vitals  No vitals data found for the desired time range.          Post Anesthesia Care and Evaluation    Patient location during evaluation: bedside  Patient participation: complete - patient participated  Level of consciousness: sleepy but conscious  Pain score: 0  Pain management: adequate  Airway patency: patent  Anesthetic complications: No anesthetic complications  PONV Status: none  Cardiovascular status: hemodynamically stable  Respiratory status: spontaneous ventilation and room air

## 2021-02-12 LAB
LAB AP CASE REPORT: NORMAL
PATH REPORT.FINAL DX SPEC: NORMAL

## 2021-02-17 ENCOUNTER — APPOINTMENT (OUTPATIENT)
Dept: CT IMAGING | Facility: HOSPITAL | Age: 56
End: 2021-02-17

## 2021-02-23 ENCOUNTER — TRANSCRIBE ORDERS (OUTPATIENT)
Dept: CT IMAGING | Facility: HOSPITAL | Age: 56
End: 2021-02-23

## 2021-02-23 DIAGNOSIS — R10.84 GENERALIZED ABDOMINAL PAIN: Primary | ICD-10-CM

## 2021-02-25 ENCOUNTER — LAB (OUTPATIENT)
Dept: LAB | Facility: HOSPITAL | Age: 56
End: 2021-02-25

## 2021-02-25 ENCOUNTER — HOSPITAL ENCOUNTER (OUTPATIENT)
Dept: CT IMAGING | Facility: HOSPITAL | Age: 56
Discharge: HOME | End: 2021-02-25

## 2021-02-25 DIAGNOSIS — R10.84 GENERALIZED ABDOMINAL PAIN: ICD-10-CM

## 2021-02-25 DIAGNOSIS — R63.4 WEIGHT LOSS: ICD-10-CM

## 2021-02-25 LAB
BUN SERPL-MCNC: 6 MG/DL (ref 6–20)
CREAT SERPL-MCNC: 0.4 MG/DL (ref 0.57–1)
GFR SERPL CREATININE-BSD FRML MDRD: >150 ML/MIN/1.73

## 2021-02-25 PROCEDURE — 0 DIATRIZOATE MEGLUMINE & SODIUM PER 1 ML: Performed by: INTERNAL MEDICINE

## 2021-02-25 PROCEDURE — 74177 CT ABD & PELVIS W/CONTRAST: CPT

## 2021-02-25 PROCEDURE — 84520 ASSAY OF UREA NITROGEN: CPT

## 2021-02-25 PROCEDURE — 25010000002 IOPAMIDOL 61 % SOLUTION: Performed by: INTERNAL MEDICINE

## 2021-02-25 PROCEDURE — 82565 ASSAY OF CREATININE: CPT

## 2021-02-25 PROCEDURE — 36415 COLL VENOUS BLD VENIPUNCTURE: CPT

## 2021-02-25 RX ADMIN — IOPAMIDOL 80 ML: 612 INJECTION, SOLUTION INTRAVENOUS at 14:53

## 2021-02-25 RX ADMIN — DIATRIZOATE MEGLUMINE AND DIATRIZOATE SODIUM 30 ML: 660; 100 LIQUID ORAL; RECTAL at 14:53

## 2021-02-26 ENCOUNTER — OFFICE VISIT (OUTPATIENT)
Dept: GASTROENTEROLOGY | Facility: CLINIC | Age: 56
End: 2021-02-26

## 2021-02-26 VITALS
HEART RATE: 95 BPM | WEIGHT: 88.4 LBS | BODY MASS INDEX: 16.27 KG/M2 | SYSTOLIC BLOOD PRESSURE: 169 MMHG | HEIGHT: 62 IN | DIASTOLIC BLOOD PRESSURE: 100 MMHG

## 2021-02-26 DIAGNOSIS — K59.09 OTHER CONSTIPATION: Primary | ICD-10-CM

## 2021-02-26 DIAGNOSIS — R63.4 WEIGHT LOSS: ICD-10-CM

## 2021-02-26 DIAGNOSIS — R10.84 GENERALIZED ABDOMINAL PAIN: ICD-10-CM

## 2021-02-26 PROCEDURE — 99214 OFFICE O/P EST MOD 30 MIN: CPT | Performed by: INTERNAL MEDICINE

## 2021-02-26 RX ORDER — POLYETHYLENE GLYCOL 3350 17 G/17G
17 POWDER, FOR SOLUTION ORAL DAILY
Qty: 30 PACKET | Refills: 6 | Status: SHIPPED | OUTPATIENT
Start: 2021-02-26 | End: 2021-03-28

## 2021-02-26 RX ORDER — OMEPRAZOLE 40 MG/1
40 CAPSULE, DELAYED RELEASE ORAL DAILY
Qty: 30 CAPSULE | Refills: 11 | Status: SHIPPED | OUTPATIENT
Start: 2021-02-26 | End: 2021-11-01

## 2021-02-26 NOTE — PATIENT INSTRUCTIONS
MyPlate from USDA    MyPlate is an outline of a general healthy diet based on the 2010 Dietary Guidelines for Americans, from the U.[place]. Department of Agriculture (USDA). It sets guidelines for how much food you should eat from each food group based on your age, sex, and level of physical activity.  What are tips for following MyPlate?  To follow MyPlate recommendations:  · Eat a wide variety of fruits and vegetables, grains, and protein foods.  · Serve smaller portions and eat less food throughout the day.  · Limit portion sizes to avoid overeating.  · Enjoy your food.  · Get at least 150 minutes of exercise every week. This is about 30 minutes each day, 5 or more days per week.  It can be difficult to have every meal look like MyPlate. Think about MyPlate as eating guidelines for an entire day, rather than each individual meal.  Fruits and vegetables  · Make half of your plate fruits and vegetables.  · Eat many different colors of fruits and vegetables each day.  · For a 2,000 calorie daily food plan, eat:  ? 2½ cups of vegetables every day.  ? 2 cups of fruit every day.  · 1 cup is equal to:  ? 1 cup raw or cooked vegetables.  ? 1 cup raw fruit.  ? 1 medium-sized orange, apple, or banana.  ? 1 cup 100% fruit or vegetable juice.  ? 2 cups raw leafy greens, such as lettuce, spinach, or kale.  ? ½ cup dried fruit.  Grains  · One fourth of your plate should be grains.  · Make at least half of the grains you eat each day whole grains.  · For a 2,000 calorie daily food plan, eat 6 oz of grains every day.  · 1 oz is equal to:  ? 1 slice bread.  ? 1 cup cereal.  ? ½ cup cooked rice, cereal, or pasta.  Protein  · One fourth of your plate should be protein.  · Eat a wide variety of protein foods, including meat, poultry, fish, eggs, beans, nuts, and tofu.  · For a 2,000 calorie daily food plan, eat 5½ oz of protein every day.  · 1 oz is equal to:  ? 1 oz meat, poultry, or fish.  ? ¼ cup cooked beans.  ? 1 egg.  ? ½ oz nuts  or seeds.  ? 1 Tbsp peanut butter.  Dairy  · Drink fat-free or low-fat (1%) milk.  · Eat or drink dairy as a side to meals.  · For a 2,000 calorie daily food plan, eat or drink 3 cups of dairy every day.  · 1 cup is equal to:  ? 1 cup milk, yogurt, cottage cheese, or soy milk (soy beverage).  ? 2 oz processed cheese.  ? 1½ oz natural cheese.  Fats, oils, salt, and sugars  · Only small amounts of oils are recommended.  · Avoid foods that are high in calories and low in nutritional value (empty calories), like foods high in fat or added sugars.  · Choose foods that are low in salt (sodium). Choose foods that have less than 140 milligrams (mg) of sodium per serving.  · Drink water instead of sugary drinks. Drink enough water each day to keep your urine pale yellow.  Where to find support  · Work with your health care provider or a nutrition specialist (dietitian) to develop a customized eating plan that is right for you.  · Download an amy (mobile application) to help you track your daily food intake.  Where to find more information  · Go to ChooseMyPlate.gov for more information.  Summary  · MyPlate is a general guideline for healthy eating from the USDA. It is based on the 2010 Dietary Guidelines for Americans.  · In general, fruits and vegetables should take up ½ of your plate, grains should take up ¼ of your plate, and protein should take up ¼ of your plate.  This information is not intended to replace advice given to you by your health care provider. Make sure you discuss any questions you have with your health care provider.  Document Revised: 05/21/2020 Document Reviewed: 03/19/2018  Elsevier Patient Education © 2020 [employer]  BMI for Adults  What is BMI?  Body mass index (BMI) is a number that is calculated from a person's weight and height. BMI can help estimate how much of a person's weight is composed of fat. BMI does not measure body fat directly. Rather, it is an alternative to procedures that  "directly measure body fat, which can be difficult and expensive.  BMI can help identify people who may be at higher risk for certain medical problems.  What are BMI measurements used for?  BMI is used as a screening tool to identify possible weight problems. It helps determine whether a person is obese, overweight, a healthy weight, or underweight.  BMI is useful for:  · Identifying a weight problem that may be related to a medical condition or may increase the risk for medical problems.  · Promoting changes, such as changes in diet and exercise, to help reach a healthy weight. BMI screening can be repeated to see if these changes are working.  How is BMI calculated?  BMI involves measuring your weight in relation to your height. Both height and weight are measured, and the BMI is calculated from those numbers. This can be done either in English ([place] or metric measurements. Note that charts and online BMI calculators are available to help you find your BMI quickly and easily without having to do these calculations yourself.  To calculate your BMI in English ([place] measurements:    1. Measure your weight in pounds (lb).  2. Multiply the number of pounds by 703.  ? For example, for a person who weighs 180 lb, multiply that number by 703, which equals 126,540.  3. Measure your height in inches. Then multiply that number by itself to get a measurement called \"inches squared.\"  ? For example, for a person who is 70 inches tall, the \"inches squared\" measurement is 70 inches x 70 inches, which equals 4,900 inches squared.  4. Divide the total from step 2 (number of lb x 703) by the total from step 3 (inches squared): 126,540 ÷ 4,900 = 25.8. This is your BMI.  To calculate your BMI in metric measurements:  1. Measure your weight in kilograms (kg).  2. Measure your height in meters (m). Then multiply that number by itself to get a measurement called \"meters squared.\"  ? For example, for a person who is 1.75 m tall, the " "\"meters squared\" measurement is 1.75 m x 1.75 m, which is equal to 3.1 meters squared.  3. Divide the number of kilograms (your weight) by the meters squared number. In this example: 70 ÷ 3.1 = 22.6. This is your BMI.  What do the results mean?  BMI charts are used to identify whether you are underweight, normal weight, overweight, or obese. The following guidelines will be used:  · Underweight: BMI less than 18.5.  · Normal weight: BMI between 18.5 and 24.9.  · Overweight: BMI between 25 and 29.9.  · Obese: BMI of 30 or above.  Keep these notes in mind:  · Weight includes both fat and muscle, so someone with a muscular build, such as an athlete, may have a BMI that is higher than 24.9. In cases like these, BMI is not an accurate measure of body fat.  · To determine if excess body fat is the cause of a BMI of 25 or higher, further assessments may need to be done by a health care provider.  · BMI is usually interpreted in the same way for men and women.  Where to find more information  For more information about BMI, including tools to quickly calculate your BMI, go to these websites:  · Centers for Disease Control and Prevention: www.cdc.gov  · American Heart Association: www.heart.org  · National Heart, Lung, and [employer] [place]: www.nhlbi.nih.gov  Summary  · Body mass index (BMI) is a number that is calculated from a person's weight and height.  · BMI may help estimate how much of a person's weight is composed of fat. BMI can help identify those who may be at higher risk for certain medical problems.  · BMI can be measured using English measurements or metric measurements.  · BMI charts are used to identify whether you are underweight, normal weight, overweight, or obese.  This information is not intended to replace advice given to you by your health care provider. Make sure you discuss any questions you have with your health care provider.  Document Revised: 09/09/2020 Document Reviewed: 07/17/2020  [employer] " Patient Education © 2020 [employer]

## 2021-02-26 NOTE — PROGRESS NOTES
Chief Complaint   Patient presents with   • 1month f/u   • endo f/u   • Constipation   • Abdominal Pain       Subjective    Muna Zayas is a 55 y.o. female.    History of Present Illness  Patient presented to GI clinic for follow-up visit today.  Has intermittent bouts of abdominal pain associated with constipation.  Symptoms are some better with MiraLAX.  Denied nausea or vomiting.  Weight is stable since last visit.  EGD was consistent with hiatal hernia, gastritis and esophagitis.  Path was consistent with Mcneil's.  Colonoscopy was consistent with diverticulosis and hemorrhoids.  CT abdomen and pelvis was consistent with hiatal hernia and renal cysts.       The following portions of the patient's history were reviewed and updated as appropriate:   Past Medical History:   Diagnosis Date   • COPD (chronic obstructive pulmonary disease) (CMS/HCC)    • Coronary artery disease    • Hypertension      Past Surgical History:   Procedure Laterality Date   • COLONOSCOPY N/A 2021    Procedure: COLONOSCOPY;  Surgeon: Diane Haynes MD;  Location: [facility], [place] ENDOSCOPY;  Service: Gastroenterology;  Laterality: N/A;   • CORONARY ANGIOPLASTY WITH STENT PLACEMENT     • ENDOSCOPY N/A 2021    Procedure: ESOPHAGOGASTRODUODENOSCOPY;  Surgeon: Diane Haynes MD;  Location: [facility] [place] ENDOSCOPY;  Service: Gastroenterology;  Laterality: N/A;   • NASAL SEPTUM SURGERY     • UPPER GASTROINTESTINAL ENDOSCOPY  2021     Family History   Problem Relation Age of Onset   • Colon cancer Paternal Grandmother      OB History        3    Para   3    Term   3            AB        Living           SAB        TAB        Ectopic        Molar        Multiple        Live Births                  Prior to Admission medications    Medication Sig Start Date End Date Taking? Authorizing Provider   aspirin 81 MG chewable tablet Chew 81 mg Daily.   Yes Provider, MD Alis   atorvastatin (LIPITOR) 20 MG tablet Take 20 mg  by mouth Daily.   Yes ProviderAlis MD   CALCIUM-VITAMIN D PO Take  by mouth.   Yes Alis Richard MD   Cartia  MG 24 hr capsule Take 240 mg by mouth Daily. 1/18/21  Yes Alis Richard MD   isosorbide mononitrate (IMDUR) 30 MG 24 hr tablet Take 30 mg by mouth Daily. 1/18/21  Yes Alis Richard MD   omeprazole (priLOSEC) 40 MG capsule Take 1 capsule by mouth Daily. 2/26/21   Diane Haynes MD   polyethylene glycol (MIRALAX) 17 g packet Take 17 g by mouth Daily for 30 days. 2/26/21 3/28/21  Diane Haynes MD   polyethylene glycol (MIRALAX) 17 g packet Take 17 g by mouth Daily for 30 days. 2/26/21 3/28/21  Diane Haynes MD   polyethylene glycol (MIRALAX) 17 g packet Take 17 g by mouth Daily for 30 days. 1/27/21 2/26/21  Diane Haynes MD     Allergies   Allergen Reactions   • Doxycycline Swelling     Face and hands swell   • Penicillins Swelling     Facial and throat swelling     Social History     Socioeconomic History   • Marital status: Single     Spouse name: Not on file   • Number of children: Not on file   • Years of education: Not on file   • Highest education level: Not on file   Tobacco Use   • Smoking status: Current Every Day Smoker     Packs/day: 2.00     Types: Cigarettes   • Smokeless tobacco: Never Used   • Tobacco comment: e cig use    Substance and Sexual Activity   • Alcohol use: Yes     Frequency: Never     Comment: 2 beers a night to sleep   • Drug use: No   • Sexual activity: Defer       Review of Systems  Review of Systems   Constitutional: Negative for chills, fatigue, fever and unexpected weight change.   HENT: Negative for congestion, ear discharge, hearing loss, nosebleeds and sore throat.    Eyes: Negative for pain, discharge and redness.   Respiratory: Negative for cough, chest tightness, shortness of breath and wheezing.    Cardiovascular: Negative for chest pain and palpitations.   Gastrointestinal: Positive for abdominal pain and  "constipation. Negative for abdominal distention, blood in stool, diarrhea, nausea and vomiting.   Endocrine: Negative for cold intolerance, polydipsia, polyphagia and polyuria.   Genitourinary: Negative for dysuria, flank pain, frequency, hematuria and urgency.   Musculoskeletal: Negative for arthralgias, back pain, joint swelling and myalgias.   Skin: Negative for color change, pallor and rash.   Neurological: Negative for tremors, seizures, syncope, weakness and headaches.   Hematological: Negative for adenopathy. Does not bruise/bleed easily.   Psychiatric/Behavioral: Negative for behavioral problems, confusion, dysphoric mood, hallucinations and suicidal ideas. The patient is not nervous/anxious.         /100 (BP Location: Left arm)   Pulse 95   Ht 157.5 cm (62\")   Wt 40.1 kg (88 lb 6.4 oz)   BMI 16.17 kg/m²     Objective    Physical Exam  Constitutional:       Appearance: She is well-developed.   HENT:      Head: Normocephalic and atraumatic.   Eyes:      Conjunctiva/sclera: Conjunctivae normal.      Pupils: Pupils are equal, round, and reactive to light.   Neck:      Musculoskeletal: Normal range of motion and neck supple.      Thyroid: No thyromegaly.   Cardiovascular:      Rate and Rhythm: Normal rate and regular rhythm.      Heart sounds: Normal heart sounds. No murmur.   Pulmonary:      Effort: Pulmonary effort is normal.      Breath sounds: Normal breath sounds. No wheezing.   Abdominal:      General: Bowel sounds are normal. There is no distension.      Palpations: Abdomen is soft. There is no mass.      Tenderness: There is no abdominal tenderness.      Hernia: No hernia is present.   Genitourinary:     Comments: No lesions noted  Musculoskeletal: Normal range of motion.         General: No tenderness.   Lymphadenopathy:      Cervical: No cervical adenopathy.   Skin:     General: Skin is warm and dry.      Findings: No rash.   Neurological:      Mental Status: She is alert and oriented to " person, place, and time.      Cranial Nerves: No cranial nerve deficit.   Psychiatric:         Thought Content: Thought content normal.       Lab on 02/25/2021   Component Date Value Ref Range Status   • BUN 02/25/2021 6  6 - 20 mg/dL Final   • Creatinine 02/25/2021 0.40* 0.57 - 1.00 mg/dL Final   • eGFR Non African Amer 02/25/2021 >150  >60 mL/min/1.73 Final     Assessment/Plan    No diagnosis found..   1.  Abdominal pain with constipation and weight loss, improving.    Continue MiraLAX 17 g p.o. daily and High-fiber diet.    Repeat colonoscopy in 5 years.  2.  Abdominal pain with weight loss, nausea and vomiting, improving.  Continue Prilosec 40 mg p.o. daily.    Gastric emptying scan if symptoms recur  3.  Family history of colon cancer, patient needs high risk screening for colorectal neoplasia.    Repeat colonoscopy in 5 years.  4.    Mcneil's esophagus, continue PPI.  Antireflux lifestyle.  Repeat EGD in 1 year.  5.  Weight loss, stabilized.  Add p.o. nutritional supplements.  Patient counseled.  6.  Tobacco usage, recommend cessation.    Orders placed during this encounter include:  No orders of the defined types were placed in this encounter.      * Surgery not found *    Review and/or summary of lab tests, radiology, procedures, medications. Review and summary of old records and obtaining of history. The risks and benefits of my recommendations, as well as other treatment options were discussed with the patient today. Questions were answered.    New Medications Ordered This Visit   Medications   • omeprazole (priLOSEC) 40 MG capsule     Sig: Take 1 capsule by mouth Daily.     Dispense:  30 capsule     Refill:  11   • polyethylene glycol (MIRALAX) 17 g packet     Sig: Take 17 g by mouth Daily for 30 days.     Dispense:  30 packet     Refill:  6   • polyethylene glycol (MIRALAX) 17 g packet     Sig: Take 17 g by mouth Daily for 30 days.     Dispense:  30 packet     Refill:  6       Follow-up: Return in about  1 month (around 3/26/2021).               Results for orders placed or performed in visit on 02/25/21   BUN    Specimen: Blood   Result Value Ref Range    BUN 6 6 - 20 mg/dL   Creatinine, Serum    Specimen: Blood   Result Value Ref Range    Creatinine 0.40 (L) 0.57 - 1.00 mg/dL    eGFR Non African Amer >150 >60 mL/min/1.73   Results for orders placed or performed during the hospital encounter of 02/09/21   Tissue Pathology Exam    Specimen: A: Small Intestine, Duodenum; Tissue    B: Gastric, Antrum; Tissue    C: Esophagus; Tissue   Result Value Ref Range    Case Report       Surgical Pathology Report                         Case: [id number]                                  Authorizing Provider:  Diane Haynes MD      Collected:           02/09/2021 02:22 PM          Ordering Location:     [facility]             Received:            02/10/2021 06:42 AM                                 [facility]                                                     Pathologist:           Farhat Velasco MD                                                          Specimens:   1) - Small Intestine, Duodenum, duodenum bx                                                         2) - Gastric, Antrum, antrum bx                                                                     3) - Esophagus, eg junction bx                                                             Final Diagnosis       SEE SCANNED REPORT       Results for orders placed or performed in visit on 02/06/21   COVID-19,[facility]TIMA PANTHER,[facility] IN-HOUSE, NP/OP SWAB IN UTM/VTM/[facility] TRANSPORT MEDIA,24 HR TAT - Swab, Nasopharynx    Specimen: Nasopharynx; Swab   Result Value Ref Range    COVID19 Not Detected Not Detected - Ref. Range   Results for orders placed or performed in visit on 12/14/20   CBC Auto Differential    Specimen: Arm, Right; Blood   Result Value Ref Range    WBC 8.55 3.40 - 10.80 10*3/mm3    RBC 4.11 3.77 - 5.28 10*6/mm3    Hemoglobin 13.5 12.0 -  15.9 g/dL    Hematocrit 39.7 34.0 - 46.6 %    MCV 96.6 79.0 - 97.0 fL    MCH 32.8 26.6 - 33.0 pg    MCHC 34.0 31.5 - 35.7 g/dL    RDW 12.1 (L) 12.3 - 15.4 %    RDW-SD 42.6 37.0 - 54.0 fl    MPV 9.5 6.0 - 12.0 fL    Platelets 216 140 - 450 10*3/mm3    Neutrophil % 58.0 42.7 - 76.0 %    Lymphocyte % 31.7 19.6 - 45.3 %    Monocyte % 8.0 5.0 - 12.0 %    Eosinophil % 1.3 0.3 - 6.2 %    Basophil % 0.6 0.0 - 1.5 %    Immature Grans % 0.4 0.0 - 0.5 %    Neutrophils, Absolute 4.97 1.70 - 7.00 10*3/mm3    Lymphocytes, Absolute 2.71 0.70 - 3.10 10*3/mm3    Monocytes, Absolute 0.68 0.10 - 0.90 10*3/mm3    Eosinophils, Absolute 0.11 0.00 - 0.40 10*3/mm3    Basophils, Absolute 0.05 0.00 - 0.20 10*3/mm3    Immature Grans, Absolute 0.03 0.00 - 0.05 10*3/mm3    nRBC 0.0 0.0 - 0.2 /100 WBC   Phosphorus    Specimen: Arm, Right; Blood   Result Value Ref Range    Phosphorus 3.6 2.5 - 4.5 mg/dL   Magnesium    Specimen: Arm, Right; Blood   Result Value Ref Range    Magnesium 1.5 (L) 1.6 - 2.6 mg/dL   Comprehensive Metabolic Panel    Specimen: Arm, Right; Blood   Result Value Ref Range    Glucose 124 (H) 65 - 99 mg/dL    BUN 8 6 - 20 mg/dL    Creatinine 0.48 (L) 0.57 - 1.00 mg/dL    Sodium 140 136 - 145 mmol/L    Potassium 3.7 3.5 - 5.2 mmol/L    Chloride 106 98 - 107 mmol/L    CO2 24.0 22.0 - 29.0 mmol/L    Calcium 9.1 8.6 - 10.5 mg/dL    Total Protein 6.8 6.0 - 8.5 g/dL    Albumin 4.00 3.50 - 5.20 g/dL    ALT (SGPT) 24 1 - 33 U/L    AST (SGOT) 28 1 - 32 U/L    Alkaline Phosphatase 123 (H) 39 - 117 U/L    Total Bilirubin 0.5 0.0 - 1.2 mg/dL    eGFR Non African Amer 134 >60 mL/min/1.73    Globulin 2.8 gm/dL    A/G Ratio 1.4 g/dL    BUN/Creatinine Ratio 16.7 7.0 - 25.0    Anion Gap 10.0 5.0 - 15.0 mmol/L   Results for orders placed or performed during the hospital encounter of 01/18/19   Gold Top - SST   Result Value Ref Range    Extra Tube Hold for add-ons.    Green Top (Gel)   Result Value Ref Range    Extra Tube Hold for add-ons.    CBC  Auto Differential    Specimen: Blood   Result Value Ref Range    WBC 13.27 (H) 3.20 - 9.80 10*3/mm3    RBC 3.92 3.77 - 5.16 10*6/mm3    Hemoglobin 12.4 12.0 - 15.5 g/dL    Hematocrit 36.3 35.0 - 45.0 %    MCV 92.6 80.0 - 98.0 fL    MCH 31.6 26.5 - 34.0 pg    MCHC 34.2 31.4 - 36.0 g/dL    RDW 12.3 11.5 - 14.5 %    RDW-SD 41.7 36.4 - 46.3 fl    MPV 10.3 8.0 - 12.0 fL    Platelets 167 150 - 450 10*3/mm3    Neutrophil % 75.4 37.0 - 80.0 %    Lymphocyte % 17.3 10.0 - 50.0 %    Monocyte % 6.6 0.0 - 12.0 %    Eosinophil % 0.2 0.0 - 7.0 %    Basophil % 0.2 0.0 - 2.0 %    Immature Grans % 0.3 0.0 - 0.5 %    Neutrophils, Absolute 10.00 (H) 2.00 - 8.60 10*3/mm3    Lymphocytes, Absolute 2.30 0.60 - 4.20 10*3/mm3    Monocytes, Absolute 0.88 0.00 - 0.90 10*3/mm3    Eosinophils, Absolute 0.03 0.00 - 0.70 10*3/mm3    Basophils, Absolute 0.02 0.00 - 0.20 10*3/mm3    Immature Grans, Absolute 0.04 (H) 0.00 - 0.02 10*3/mm3   Lavender Top   Result Value Ref Range    Extra Tube hold for add-on    Light Blue Top   Result Value Ref Range    Extra Tube hold for add-on    Troponin    Specimen: Blood   Result Value Ref Range    Troponin I <0.012 <=0.034 ng/mL   Blood Culture - Blood, Arm, Right    Specimen: Arm, Right; Blood   Result Value Ref Range    Blood Culture No growth at 5 days    Blood Culture - Blood, Arm, Right    Specimen: Arm, Right; Blood   Result Value Ref Range    Blood Culture No growth at 5 days    Influenza Antigen, Rapid - Swab, Nasopharynx    Specimen: Nasopharynx; Swab   Result Value Ref Range    Influenza A Ag, EIA Negative Negative    Influenza B Ag, EIA Negative Negative   BNP    Specimen: Blood   Result Value Ref Range    proBNP 1,110.0 (H) 0.0 - 900.0 pg/mL   Comprehensive Metabolic Panel    Specimen: Blood   Result Value Ref Range    Glucose 105 (H) 60 - 100 mg/dL    BUN 5 (L) 7 - 21 mg/dL    Creatinine 0.44 (L) 0.50 - 1.00 mg/dL    Sodium 131 (L) 137 - 145 mmol/L    Potassium 3.0 (L) 3.5 - 5.1 mmol/L    Chloride  103 95 - 110 mmol/L    CO2 24.0 22.0 - 31.0 mmol/L    Calcium 9.4 8.4 - 10.2 mg/dL    Total Protein 6.4 6.3 - 8.6 g/dL    Albumin 3.90 3.40 - 4.80 g/dL    ALT (SGPT) 23 9 - 52 U/L    AST (SGOT) 27 14 - 36 U/L    Alkaline Phosphatase 130 (H) 38 - 126 U/L    Total Bilirubin 0.9 0.2 - 1.3 mg/dL    eGFR Non  Amer 150 (H) 51 - 120 mL/min/1.73    Globulin 2.5 2.3 - 3.5 gm/dL    A/G Ratio 1.6 1.1 - 1.8 g/dL    BUN/Creatinine Ratio 11.4 7.0 - 25.0    Anion Gap 4.0 (L) 5.0 - 15.0 mmol/L     *Note: Due to a large number of results and/or encounters for the requested time period, some results have not been displayed. A complete set of results can be found in Results Review.         This document has been electronically signed by Diane Haynes MD on February 26, 2021 11:31 CST

## 2021-06-14 ENCOUNTER — LAB (OUTPATIENT)
Dept: ONCOLOGY | Facility: HOSPITAL | Age: 56
End: 2021-06-14

## 2021-06-14 DIAGNOSIS — M81.0 AGE-RELATED OSTEOPOROSIS WITHOUT CURRENT PATHOLOGICAL FRACTURE: ICD-10-CM

## 2021-06-14 LAB
25(OH)D3 SERPL-MCNC: 54.1 NG/ML
ALBUMIN SERPL-MCNC: 3.7 G/DL (ref 3.5–5.2)
ALBUMIN/GLOB SERPL: 1.1 G/DL
ALP SERPL-CCNC: 123 U/L (ref 39–117)
ALT SERPL W P-5'-P-CCNC: 19 U/L (ref 1–33)
ANION GAP SERPL CALCULATED.3IONS-SCNC: 9 MMOL/L (ref 5–15)
AST SERPL-CCNC: 31 U/L (ref 1–32)
BASOPHILS # BLD AUTO: 0.05 10*3/MM3 (ref 0–0.2)
BASOPHILS NFR BLD AUTO: 0.7 % (ref 0–1.5)
BILIRUB SERPL-MCNC: 0.3 MG/DL (ref 0–1.2)
BUN SERPL-MCNC: 7 MG/DL (ref 6–20)
BUN/CREAT SERPL: 14.6 (ref 7–25)
CALCIUM SPEC-SCNC: 9.2 MG/DL (ref 8.6–10.5)
CHLORIDE SERPL-SCNC: 107 MMOL/L (ref 98–107)
CO2 SERPL-SCNC: 27 MMOL/L (ref 22–29)
CREAT SERPL-MCNC: 0.48 MG/DL (ref 0.57–1)
DEPRECATED RDW RBC AUTO: 42.3 FL (ref 37–54)
EOSINOPHIL # BLD AUTO: 0.17 10*3/MM3 (ref 0–0.4)
EOSINOPHIL NFR BLD AUTO: 2.4 % (ref 0.3–6.2)
ERYTHROCYTE [DISTWIDTH] IN BLOOD BY AUTOMATED COUNT: 12.2 % (ref 12.3–15.4)
GFR SERPL CREATININE-BSD FRML MDRD: 134 ML/MIN/1.73
GLOBULIN UR ELPH-MCNC: 3.3 GM/DL
GLUCOSE SERPL-MCNC: 133 MG/DL (ref 65–99)
HCT VFR BLD AUTO: 40.5 % (ref 34–46.6)
HGB BLD-MCNC: 13.4 G/DL (ref 12–15.9)
IMM GRANULOCYTES # BLD AUTO: 0.02 10*3/MM3 (ref 0–0.05)
IMM GRANULOCYTES NFR BLD AUTO: 0.3 % (ref 0–0.5)
LYMPHOCYTES # BLD AUTO: 2.24 10*3/MM3 (ref 0.7–3.1)
LYMPHOCYTES NFR BLD AUTO: 31 % (ref 19.6–45.3)
MAGNESIUM SERPL-MCNC: 1.7 MG/DL (ref 1.6–2.6)
MCH RBC QN AUTO: 31.5 PG (ref 26.6–33)
MCHC RBC AUTO-ENTMCNC: 33.1 G/DL (ref 31.5–35.7)
MCV RBC AUTO: 95.1 FL (ref 79–97)
MONOCYTES # BLD AUTO: 0.66 10*3/MM3 (ref 0.1–0.9)
MONOCYTES NFR BLD AUTO: 9.1 % (ref 5–12)
NEUTROPHILS NFR BLD AUTO: 4.08 10*3/MM3 (ref 1.7–7)
NEUTROPHILS NFR BLD AUTO: 56.5 % (ref 42.7–76)
NRBC BLD AUTO-RTO: 0 /100 WBC (ref 0–0.2)
PHOSPHATE SERPL-MCNC: 3.9 MG/DL (ref 2.5–4.5)
PLATELET # BLD AUTO: 242 10*3/MM3 (ref 140–450)
PMV BLD AUTO: 9.6 FL (ref 6–12)
POTASSIUM SERPL-SCNC: 3.8 MMOL/L (ref 3.5–5.2)
PROT SERPL-MCNC: 7 G/DL (ref 6–8.5)
RBC # BLD AUTO: 4.26 10*6/MM3 (ref 3.77–5.28)
SODIUM SERPL-SCNC: 143 MMOL/L (ref 136–145)
WBC # BLD AUTO: 7.22 10*3/MM3 (ref 3.4–10.8)

## 2021-06-14 PROCEDURE — 83735 ASSAY OF MAGNESIUM: CPT | Performed by: NURSE PRACTITIONER

## 2021-06-14 PROCEDURE — 84100 ASSAY OF PHOSPHORUS: CPT | Performed by: NURSE PRACTITIONER

## 2021-06-14 PROCEDURE — 80053 COMPREHEN METABOLIC PANEL: CPT | Performed by: NURSE PRACTITIONER

## 2021-06-14 PROCEDURE — 85025 COMPLETE CBC W/AUTO DIFF WBC: CPT | Performed by: NURSE PRACTITIONER

## 2021-06-14 PROCEDURE — 36415 COLL VENOUS BLD VENIPUNCTURE: CPT | Performed by: NURSE PRACTITIONER

## 2021-06-14 PROCEDURE — 82306 VITAMIN D 25 HYDROXY: CPT

## 2021-06-15 ENCOUNTER — TELEPHONE (OUTPATIENT)
Dept: ORTHOPEDIC SURGERY | Facility: CLINIC | Age: 56
End: 2021-06-15

## 2021-06-15 NOTE — PROGRESS NOTES
Please let patient know that her vitamin D level is normal so she can continue with her current dose of vitamin D supplementation daily as this seems to be sufficient.  She also needs to continue with her calcium supplement daily.  Thanks.

## 2021-06-15 NOTE — TELEPHONE ENCOUNTER
----- Message from FREDI Villegas sent at 6/15/2021  8:35 AM CDT -----  Please let patient know that her vitamin D level is normal so she can continue with her current dose of vitamin D supplementation daily as this seems to be sufficient.  She also needs to continue with her calcium supplement daily.  Thanks.

## 2021-06-22 ENCOUNTER — INFUSION (OUTPATIENT)
Dept: ONCOLOGY | Facility: HOSPITAL | Age: 56
End: 2021-06-22

## 2021-06-22 VITALS — DIASTOLIC BLOOD PRESSURE: 89 MMHG | TEMPERATURE: 97.6 F | HEART RATE: 86 BPM | SYSTOLIC BLOOD PRESSURE: 165 MMHG

## 2021-06-22 DIAGNOSIS — M81.0 SENILE OSTEOPOROSIS: Primary | ICD-10-CM

## 2021-06-22 PROCEDURE — 96372 THER/PROPH/DIAG INJ SC/IM: CPT | Performed by: NURSE PRACTITIONER

## 2021-06-22 PROCEDURE — 25010000002 DENOSUMAB 60 MG/ML SOLUTION PREFILLED SYRINGE: Performed by: NURSE PRACTITIONER

## 2021-06-22 RX ADMIN — DENOSUMAB 60 MG: 60 INJECTION SUBCUTANEOUS at 15:02

## 2021-07-27 ENCOUNTER — HOSPITAL ENCOUNTER (OUTPATIENT)
Dept: ULTRASOUND IMAGING | Facility: HOSPITAL | Age: 56
Discharge: HOME | End: 2021-07-27
Admitting: NURSE PRACTITIONER

## 2021-07-27 DIAGNOSIS — R93.89 ABNORMAL X-RAY: ICD-10-CM

## 2021-07-27 DIAGNOSIS — M54.2 NECK PAIN: ICD-10-CM

## 2021-07-27 DIAGNOSIS — M25.511 ACUTE PAIN OF RIGHT SHOULDER: ICD-10-CM

## 2021-07-27 PROCEDURE — 93880 EXTRACRANIAL BILAT STUDY: CPT

## 2021-11-01 ENCOUNTER — OFFICE VISIT (OUTPATIENT)
Dept: ORTHOPEDIC SURGERY | Facility: CLINIC | Age: 56
End: 2021-11-01

## 2021-11-01 VITALS — HEIGHT: 62 IN | WEIGHT: 83.7 LBS | BODY MASS INDEX: 15.4 KG/M2

## 2021-11-01 DIAGNOSIS — M81.0 POSTMENOPAUSAL OSTEOPOROSIS: Primary | ICD-10-CM

## 2021-11-01 DIAGNOSIS — R63.6 UNDERWEIGHT: ICD-10-CM

## 2021-11-01 DIAGNOSIS — Z78.0 POSTMENOPAUSAL: ICD-10-CM

## 2021-11-01 DIAGNOSIS — Z72.0 TOBACCO ABUSE: ICD-10-CM

## 2021-11-01 DIAGNOSIS — J44.9 CHRONIC OBSTRUCTIVE PULMONARY DISEASE, UNSPECIFIED COPD TYPE (HCC): ICD-10-CM

## 2021-11-01 DIAGNOSIS — E28.319 EARLY MENOPAUSE OCCURRING IN PATIENT AGE YOUNGER THAN 45 YEARS: ICD-10-CM

## 2021-11-01 PROCEDURE — 99214 OFFICE O/P EST MOD 30 MIN: CPT | Performed by: NURSE PRACTITIONER

## 2021-11-01 NOTE — PROGRESS NOTES
"Muna Zayas is a 56 y.o. female returns for     Chief Complaint   Patient presents with   • Osteoporosis     [facility]       HISTORY OF PRESENT ILLNESS: [facility] for osteoporosis follow-up.  Patient was last seen in the [facility] to establish care on 9/28/2020.  Patient was started on Prolia injections for treatment of osteoporosis.  Patient's initial Prolia injection was given on 12/17/2020.  Since her last office visit, she has had 2 doses of Prolia.  Patient states she is tolerating the Prolia well with no known adverse effects.  Prior to Prolia, patient had tried taking Fosamax but could not tolerate this medication and developed adverse GI side effects including reflux, upset stomach and ulcers in her mouth.  Patient denies any changes in her bone health.  Patient has not sustained any new fractures since her last office visit.    Risk factors for osteoporosis include her age, race, gender, postmenopausal status with early menopause in her early 40s, low body weight with a current BMI of 15.3, long history of heavy tobacco use and comorbid medical conditions including COPD.  Previous DEXA scan performed in 2020 showed osteoporosis at her lumbar spine with a T score of -3.6 and osteoporosis in the left femoral neck with a T score of -3.4.     CONCURRENT MEDICAL HISTORY:    The following portions of the patient's history were reviewed and updated as appropriate: allergies, current medications, past family history, past medical history, past social history, past surgical history and problem list.     ROS  No fevers or chills.  No chest pain or shortness of air.  No GI or  disturbances.    PHYSICAL EXAMINATION:       Ht 157.5 cm (62\")   Wt 38 kg (83 lb 11.2 oz)   BMI 15.31 kg/m²     Physical Exam  Vitals reviewed.   Constitutional:       General: She is not in acute distress.     Appearance: She is well-developed. She is not ill-appearing.   HENT:      Head: " Normocephalic.   Pulmonary:      Effort: Pulmonary effort is normal. No respiratory distress.   Abdominal:      General: There is no distension.      Palpations: Abdomen is soft.   Musculoskeletal:         General: No swelling, tenderness, deformity or signs of injury.   Skin:     General: Skin is warm and dry.      Capillary Refill: Capillary refill takes less than 2 seconds.      Findings: No erythema.   Neurological:      Mental Status: She is alert and oriented to person, place, and time.      GCS: GCS eye subscore is 4. GCS verbal subscore is 5. GCS motor subscore is 6.   Psychiatric:         Speech: Speech normal.         Behavior: Behavior normal.         Thought Content: Thought content normal.         Judgment: Judgment normal.         GAIT:     [x]  Normal  []  Antalgic    Assistive device: [x]  None  []  Walker     []  Crutches  []  Cane     []  Wheelchair  []  Stretcher        DEXA scan, bone density study.            CLINICAL INDICATION: Osteoporosis screening evaluation      COMPARISON: Information not available     PROCEDURE/TECHNIQUE:  Multiple images of the left hip and lumbar spine were obtained  using dual absorption technique.     FINDINGS:  The exam is performed using the Apportable horizon C unit.  Images  are of satisfactory quality.     Total Lumbar spine:   0.655  gm/cm2     T-Score -3.6         Femoral neck Left Hip:            0.508     gm/cm2     T-Score  -3.4         IMPRESSION:  This patient has bone density parameters in both the  lumbar spine and left hip in the rimma osteoporosis category.  Patients with bone density parameters in this range have a high  lifetime risk of fracture at involved sites.        WORLD HEALTH ORGANIZATION CRITERIA FOR OSTEOPOROSIS     DIAGNOSTIC CATEGORY    T-SCORE  Normal......................................................0 to  - 1.0  Osteopenia..............................................-1.0  to  -2.5  Osteoporosis...........................................Greater  than -2.5 (no history of fragility fractures)*  Established Osteoporosis........................Greater than -2.5  (with history of fragility fractures)*     *FRAGILITY FRACTURES: VERTEBRAE, HUMERUS, WRIST, HIP (OVER 40  YEARS OF AGE)     Electronically signed by:  Juwan Moffett MD  7/14/2020 2:26 PM CDT  Workstation: [id number]    Comprehensive Metabolic Panel  Order: [id number]   Status: Final result     Visible to patient: Yes (not seen)     Next appt: 12/16/2021 at 03:15 PM in Oncology ([facility])     Dx: Senile osteoporosis    Specimen Information: Blood         0 Result Notes    Component   Ref Range & Units 4 mo ago   (6/14/21) 8 mo ago   (2/25/21) 8 mo ago   (2/25/21) 10 mo ago   (12/14/20) 2 yr ago   (1/19/19) 4 yr ago   (1/16/17) 5 yr ago   (11/10/15)   Glucose   65 - 99 mg/dL 133 High     124 High   105 High  R  83 R  147 High  R    BUN   6 - 20 mg/dL 7   6  8  5 Low  R  8 R  10 R    Creatinine   0.57 - 1.00 mg/dL 0.48 Low   0.40 Low    0.48 Low   0.44 Low  R  0.5 R  0.4 Low  R    Sodium   136 - 145 mmol/L 143    140  131 Low  R  138 R  141 R    Potassium   3.5 - 5.2 mmol/L 3.8    3.7  3.0 Low  R  3.9 R  4.2 R    Chloride   98 - 107 mmol/L 107    106  103 R  105 R  107 R    CO2   22.0 - 29.0 mmol/L 27.0    24.0  24.0 R      Calcium   8.6 - 10.5 mg/dL 9.2    9.1  9.4 R  8.9 R  9.2 R    Total Protein   6.0 - 8.5 g/dL 7.0    6.8  6.4 R   8.0 R    Albumin   3.50 - 5.20 g/dL 3.70    4.00  3.90 R   4.5 R    ALT (SGPT)   1 - 33 U/L 19    24  23 R   29 R    AST (SGOT)   1 - 32 U/L 31    28  27 R   32 R    Alkaline Phosphatase   39 - 117 U/L 123 High     123 High   130 High  R   150 High  R    Total Bilirubin   0.0 - 1.2 mg/dL 0.3    0.5  0.9 R   0.7 R    eGFR Non African Amer   >60 mL/min/1.73 134  >150   134  150 High  R      Globulin   gm/dL 3.3    2.8  2.5 R      A/G Ratio   g/dL 1.1    1.4  1.6 R      BUN/Creatinine Ratio   7.0  - 25.0 14.6    16.7  11.4      Anion Gap   5.0 - 15.0 mmol/L 9.0    10.0  4.0 Low   9.0  16.0 High     Resulting Agency [facility]             Narrative  Performed by: [facility]  GFR Normal >60   Chronic Kidney Disease <60   Kidney Failure <15       Specimen Collected: 06/14/21 10:24 Last Resulted: 06/14/21 11:05           Vitamin D 25 Hydroxy  Order: [id number]   Status: Final result     Visible to patient: Yes (not seen)     Next appt: 12/16/2021 at 03:15 PM in Oncology ([facility])     Dx: Age-related osteoporosis without curr...    Specimen Information: Blood         1 Result Note     1 Follow-up Encounter    Component   Ref Range & Units 4 mo ago   25 Hydroxy, Vitamin D   ng/ml 54.1    Resulting Agency  [facility]             Narrative  Performed by:  [facility]  Reference Range for Total Vitamin D 25(OH)     Deficiency <20.0 ng/mL   Insufficiency 21-29 ng/mL   Sufficiency  ng/mL   Toxicity >100 ng/ml     Results may be falsely increased if patient taking Biotin.       Specimen Collected: 06/14/21 10:24 Last Resulted: 06/14/21 19:42               ASSESSMENT:    Diagnoses and all orders for this visit:    Postmenopausal osteoporosis  -     DEXA Bone Density Axial; Future    Postmenopausal    Early menopause occurring in patient age younger than 45 years    Chronic obstructive pulmonary disease, unspecified COPD type (HCC)    Underweight    Tobacco abuse    PLAN    Patient is doing well overall with no changes in her bone health and she has not sustained any fractures since her last visit with [facility].  Patient is due for a repeat bone density study.  Last DEXA scan was on 7/14/2020.  We discussed that I will place an order for a new DEXA scan and they will call her to schedule this.  We discussed that I will send her the results in the mail for her to review.  Patient is tolerating the Prolia well with no known adverse  effects.  She previously tried and failed Fosamax and did not tolerate it due to adverse GI side effects.  Patient started Prolia and had her initial injection on 12/17/2020.  The patient's most recent labs from 6/14/2021 are reviewed and she is noted to have a normal calcium level.  Patient has no history of hypocalcemia.  Patient also has normal renal functions with a current GFR of 134.  Vitamin D level from that same date is noted to be 54.1.     Since she is tolerating the medication well, would recommend continue with Prolia injections for treatment of osteoporosis.  Recommend to continue with her calcium and vitamin D supplementation daily.  Patient confirms that she is taking the calcium and vitamin D supplement as instructed.  Recommend to make efforts for regular weightbearing exercise, such as walking, which will help keep her bones strong and prevent worsening osteoporosis.    Follow-up with [facility] in 1 year for recheck.    Time spent of a minimum of 30 minutes including the face to face evaluation, reviewing of medical history and prior medial records, reviewing of diagnostic studies, ordering additional tests, prescription management, documentation, patient education and coordination of care.     EMR Dragon/Transciption Disclaimer: Some of this note may be an electronic transcription/translation of spoken language to printed text.  The electronic translation of spoken language may permit erroneous, or at times, nonsensical words or phrases to be inadvertently transcribed. Although I have reviewed the note for such errors, some may still exist.     Return in about 1 year (around 11/1/2022) for Recheck.        This document has been electronically signed by FREDI Villegas on November 1, 2021 10:24 CDT      FREDI Villegas

## 2021-11-19 DIAGNOSIS — M81.0 SENILE OSTEOPOROSIS: Primary | ICD-10-CM

## 2021-12-16 ENCOUNTER — LAB (OUTPATIENT)
Dept: LAB | Facility: HOSPITAL | Age: 56
End: 2021-12-16

## 2021-12-16 DIAGNOSIS — M81.0 SENILE OSTEOPOROSIS: ICD-10-CM

## 2021-12-16 LAB
ALBUMIN SERPL-MCNC: 4.2 G/DL (ref 3.5–5.2)
ALBUMIN/GLOB SERPL: 1.8 G/DL
ALP SERPL-CCNC: 122 U/L (ref 39–117)
ALT SERPL W P-5'-P-CCNC: 18 U/L (ref 1–33)
ANION GAP SERPL CALCULATED.3IONS-SCNC: 8 MMOL/L (ref 5–15)
AST SERPL-CCNC: 28 U/L (ref 1–32)
BILIRUB SERPL-MCNC: 0.4 MG/DL (ref 0–1.2)
BUN SERPL-MCNC: 8 MG/DL (ref 6–20)
BUN/CREAT SERPL: 15.7 (ref 7–25)
CALCIUM SPEC-SCNC: 8.7 MG/DL (ref 8.6–10.5)
CHLORIDE SERPL-SCNC: 103 MMOL/L (ref 98–107)
CO2 SERPL-SCNC: 28 MMOL/L (ref 22–29)
CREAT SERPL-MCNC: 0.51 MG/DL (ref 0.57–1)
GFR SERPL CREATININE-BSD FRML MDRD: 125 ML/MIN/1.73
GLOBULIN UR ELPH-MCNC: 2.3 GM/DL
GLUCOSE SERPL-MCNC: 105 MG/DL (ref 65–99)
MAGNESIUM SERPL-MCNC: 1.7 MG/DL (ref 1.6–2.6)
PHOSPHATE SERPL-MCNC: 2.9 MG/DL (ref 2.5–4.5)
POTASSIUM SERPL-SCNC: 3.6 MMOL/L (ref 3.5–5.2)
PROT SERPL-MCNC: 6.5 G/DL (ref 6–8.5)
SODIUM SERPL-SCNC: 139 MMOL/L (ref 136–145)

## 2021-12-16 PROCEDURE — 84100 ASSAY OF PHOSPHORUS: CPT

## 2021-12-16 PROCEDURE — 83735 ASSAY OF MAGNESIUM: CPT

## 2021-12-16 PROCEDURE — 80053 COMPREHEN METABOLIC PANEL: CPT

## 2022-01-31 DIAGNOSIS — M81.0 SENILE OSTEOPOROSIS: Primary | ICD-10-CM

## 2022-02-01 ENCOUNTER — LAB (OUTPATIENT)
Dept: LAB | Facility: HOSPITAL | Age: 57
End: 2022-02-01

## 2022-02-01 PROCEDURE — 84100 ASSAY OF PHOSPHORUS: CPT | Performed by: NURSE PRACTITIONER

## 2022-02-01 PROCEDURE — 83735 ASSAY OF MAGNESIUM: CPT | Performed by: NURSE PRACTITIONER

## 2022-02-01 PROCEDURE — 80053 COMPREHEN METABOLIC PANEL: CPT | Performed by: NURSE PRACTITIONER

## 2022-02-08 ENCOUNTER — INFUSION (OUTPATIENT)
Dept: ONCOLOGY | Facility: HOSPITAL | Age: 57
End: 2022-02-08

## 2022-02-08 VITALS
DIASTOLIC BLOOD PRESSURE: 60 MMHG | HEART RATE: 88 BPM | SYSTOLIC BLOOD PRESSURE: 150 MMHG | RESPIRATION RATE: 18 BRPM | TEMPERATURE: 97.4 F

## 2022-02-08 DIAGNOSIS — M81.0 SENILE OSTEOPOROSIS: Primary | ICD-10-CM

## 2022-02-08 PROCEDURE — 25010000002 DENOSUMAB 60 MG/ML SOLUTION PREFILLED SYRINGE: Performed by: NURSE PRACTITIONER

## 2022-02-08 PROCEDURE — 96372 THER/PROPH/DIAG INJ SC/IM: CPT | Performed by: NURSE PRACTITIONER

## 2022-02-08 RX ADMIN — DENOSUMAB 60 MG: 60 INJECTION SUBCUTANEOUS at 11:01

## 2022-03-08 ENCOUNTER — OFFICE VISIT (OUTPATIENT)
Dept: GASTROENTEROLOGY | Facility: CLINIC | Age: 57
End: 2022-03-08

## 2022-03-08 VITALS
BODY MASS INDEX: 16.2 KG/M2 | SYSTOLIC BLOOD PRESSURE: 147 MMHG | WEIGHT: 85.8 LBS | HEIGHT: 61 IN | DIASTOLIC BLOOD PRESSURE: 84 MMHG | HEART RATE: 93 BPM

## 2022-03-08 DIAGNOSIS — K59.09 OTHER CONSTIPATION: Primary | ICD-10-CM

## 2022-03-08 DIAGNOSIS — K22.70 BARRETT'S ESOPHAGUS WITHOUT DYSPLASIA: ICD-10-CM

## 2022-03-08 PROCEDURE — 99213 OFFICE O/P EST LOW 20 MIN: CPT | Performed by: INTERNAL MEDICINE

## 2022-03-08 RX ORDER — CALCIUM CARBONATE 500(1250)
TABLET ORAL
COMMUNITY
Start: 2022-02-14 | End: 2022-04-29

## 2022-03-08 RX ORDER — DILTIAZEM HYDROCHLORIDE 240 MG/1
240 CAPSULE, EXTENDED RELEASE ORAL DAILY
COMMUNITY
Start: 2021-09-21

## 2022-03-08 RX ORDER — OMEPRAZOLE 40 MG/1
40 CAPSULE, DELAYED RELEASE ORAL DAILY
Qty: 30 CAPSULE | Refills: 11 | Status: SHIPPED | OUTPATIENT
Start: 2022-03-08

## 2022-03-08 RX ORDER — NITROGLYCERIN 0.4 MG/1
TABLET SUBLINGUAL
COMMUNITY
Start: 2021-12-31

## 2022-03-08 RX ORDER — ATORVASTATIN CALCIUM 10 MG/1
10 TABLET, FILM COATED ORAL DAILY
COMMUNITY
Start: 2021-12-13

## 2022-03-08 RX ORDER — OMEGA-3S/DHA/EPA/FISH OIL/D3 300MG-1000
400 CAPSULE ORAL DAILY
COMMUNITY
End: 2022-04-29

## 2022-03-08 RX ORDER — DENOSUMAB 60 MG/ML
60 INJECTION SUBCUTANEOUS
COMMUNITY

## 2022-04-03 NOTE — PROGRESS NOTES
Chief Complaint   Patient presents with   • Repeat EGD     Prior EGD/Colonoscopy Performed 2021       Subjective    Muna Zayas is a 57 y.o. female.    History of Present Illness  Patient presented to GI clinic for follow-up visit today.  Has intermittent symptoms with abdominal pain, constipation and fatigue.  Denied nausea or vomiting.  Weight is stable.  EGD last year was consistent with hiatal hernia, esophagitis and gastritis.  Path was consistent with Mcneil's. Colonoscopy was consistent with diverticulosis and hemorrhoids.       The following portions of the patient's history were reviewed and updated as appropriate:   Past Medical History:   Diagnosis Date   • COPD (chronic obstructive pulmonary disease) (HCC)    • Coronary artery disease    • Hypertension      Past Surgical History:   Procedure Laterality Date   • COLONOSCOPY N/A 2021    Procedure: COLONOSCOPY;  Surgeon: Diane Haynes MD;  Location: [facility] ENDOSCOPY;  Service: Gastroenterology;  Laterality: N/A;   • CORONARY ANGIOPLASTY WITH STENT PLACEMENT     • ENDOSCOPY N/A 2021    Procedure: ESOPHAGOGASTRODUODENOSCOPY;  Surgeon: Diane Haynes MD;  Location: [facility] ENDOSCOPY;  Service: Gastroenterology;  Laterality: N/A;   • NASAL SEPTUM SURGERY     • UPPER GASTROINTESTINAL ENDOSCOPY  2021     Family History   Problem Relation Age of Onset   • Colon cancer Paternal Grandmother      OB History        3    Para   3    Term   3            AB        Living           SAB        IAB        Ectopic        Molar        Multiple        Live Births                  Prior to Admission medications    Medication Sig Start Date End Date Taking? Authorizing Provider   aspirin 81 MG chewable tablet Chew 81 mg Daily.   Yes Alis Richard MD   atorvastatin (LIPITOR) 10 MG tablet Take 10 mg by mouth Daily. 21  Yes Alis Richard MD   calcium carbonate, oyster shell, 500 MG tablet tablet TAKE 1 TABLET BY  MOUTH DAILY WITH A MEAL 2/14/22  Yes Alis Richard MD   CALCIUM-VITAMIN D PO Take 1 tablet by mouth Daily.   Yes Alis Richard MD   Cartia  MG 24 hr capsule Take 240 mg by mouth Daily. 1/18/21  Yes Alis Richard MD   cholecalciferol (VITAMIN D3) 10 MCG (400 UNIT) tablet Take 400 Units by mouth Daily.   Yes Alis Richard MD   denosumab (Prolia) 60 MG/ML solution prefilled syringe syringe Inject 60 mg under the skin into the appropriate area as directed Every 6 (Six) Months.   Yes Alis Richard MD   dilTIAZem (TIAZAC) 240 MG 24 hr capsule Take 240 mg by mouth Daily. 9/21/21  Yes Alis Richard MD   isosorbide mononitrate (IMDUR) 30 MG 24 hr tablet Take 30 mg by mouth 2 (Two) Times a Day. 1/18/21  Yes Alis Richard MD   nitroglycerin (NITROSTAT) 0.4 MG SL tablet PLACE 1 TABLET UNDER TONGUE EVERY FIVE MINUTES (MAX. OF 3) IF NO RELIEF SEEK ER 12/31/21  Yes Alis Richard MD   linaclotide (LINZESS) 290 MCG capsule capsule Take 1 capsule by mouth Every Morning Before Breakfast. 3/8/22   Diane Haynes MD   omeprazole (priLOSEC) 40 MG capsule Take 1 capsule by mouth Daily. 3/8/22   Diane Haynes MD     Allergies   Allergen Reactions   • Doxycycline Swelling     Face and hands swell   • Penicillins Swelling     Facial and throat swelling     Social History     Socioeconomic History   • Marital status: Single   Tobacco Use   • Smoking status: Current Every Day Smoker     Packs/day: 2.00     Types: Cigarettes   • Smokeless tobacco: Never Used   Vaping Use   • Vaping Use: Former   • Substances: Nicotine   • Devices: 03/08/2022 - Patient states device was refillable.   Substance and Sexual Activity   • Alcohol use: Yes     Comment: 03/08/2022 - Patient confirmed she consumes 2 Beer per night to aid in sleep.   • Drug use: Never   • Sexual activity: Defer       Review of Systems  Review of Systems   Constitutional: Positive for fatigue. Negative for  "chills, fever and unexpected weight change.   HENT: Negative for congestion, ear discharge, hearing loss, nosebleeds and sore throat.    Eyes: Negative for pain, discharge and redness.   Respiratory: Negative for cough, chest tightness, shortness of breath and wheezing.    Cardiovascular: Negative for chest pain and palpitations.   Gastrointestinal: Positive for abdominal pain and constipation. Negative for abdominal distention, blood in stool, diarrhea, nausea and vomiting.   Endocrine: Negative for cold intolerance, polydipsia, polyphagia and polyuria.   Genitourinary: Negative for dysuria, flank pain, frequency, hematuria and urgency.   Musculoskeletal: Negative for arthralgias, back pain, joint swelling and myalgias.   Skin: Negative for color change, pallor and rash.   Neurological: Negative for tremors, seizures, syncope, weakness and headaches.   Hematological: Negative for adenopathy. Does not bruise/bleed easily.   Psychiatric/Behavioral: Negative for behavioral problems, confusion, dysphoric mood, hallucinations and suicidal ideas. The patient is not nervous/anxious.         /84   Pulse 93   Ht 154.9 cm (61\")   Wt 38.9 kg (85 lb 12.8 oz)   BMI 16.21 kg/m²     Objective    Physical Exam  Constitutional:       Appearance: She is well-developed.   HENT:      Head: Normocephalic and atraumatic.   Eyes:      Conjunctiva/sclera: Conjunctivae normal.      Pupils: Pupils are equal, round, and reactive to light.   Neck:      Thyroid: No thyromegaly.   Cardiovascular:      Rate and Rhythm: Normal rate and regular rhythm.      Heart sounds: Normal heart sounds. No murmur heard.  Pulmonary:      Effort: Pulmonary effort is normal.      Breath sounds: Normal breath sounds. No wheezing.   Abdominal:      General: Bowel sounds are normal. There is no distension.      Palpations: Abdomen is soft. There is no mass.      Tenderness: There is no abdominal tenderness.      Hernia: No hernia is present. "   Genitourinary:     Comments: No lesions noted  Musculoskeletal:         General: No tenderness. Normal range of motion.      Cervical back: Normal range of motion and neck supple.   Lymphadenopathy:      Cervical: No cervical adenopathy.   Skin:     General: Skin is warm and dry.      Findings: No rash.   Neurological:      Mental Status: She is alert and oriented to person, place, and time.      Cranial Nerves: No cranial nerve deficit.   Psychiatric:         Thought Content: Thought content normal.       Lab on 02/01/2022   Component Date Value Ref Range Status   • Glucose 02/01/2022 130 (A) 65 - 99 mg/dL Final   • BUN 02/01/2022 8  6 - 20 mg/dL Final   • Creatinine 02/01/2022 0.66  0.57 - 1.00 mg/dL Final   • Sodium 02/01/2022 141  136 - 145 mmol/L Final   • Potassium 02/01/2022 4.0  3.5 - 5.2 mmol/L Final   • Chloride 02/01/2022 104  98 - 107 mmol/L Final   • CO2 02/01/2022 26.0  22.0 - 29.0 mmol/L Final   • Calcium 02/01/2022 9.5  8.6 - 10.5 mg/dL Final   • Total Protein 02/01/2022 6.8  6.0 - 8.5 g/dL Final   • Albumin 02/01/2022 4.00  3.50 - 5.20 g/dL Final   • ALT (SGPT) 02/01/2022 22  1 - 33 U/L Final   • AST (SGOT) 02/01/2022 36 (A) 1 - 32 U/L Final   • Alkaline Phosphatase 02/01/2022 144 (A) 39 - 117 U/L Final   • Total Bilirubin 02/01/2022 0.4  0.0 - 1.2 mg/dL Final   • eGFR Non African Amer 02/01/2022 93  >60 mL/min/1.73 Final   • Globulin 02/01/2022 2.8  gm/dL Final   • A/G Ratio 02/01/2022 1.4  g/dL Final   • BUN/Creatinine Ratio 02/01/2022 12.1  7.0 - 25.0 Final   • Anion Gap 02/01/2022 11.0  5.0 - 15.0 mmol/L Final   • Magnesium 02/01/2022 1.7  1.6 - 2.6 mg/dL Final   • Phosphorus 02/01/2022 5.1 (A) 2.5 - 4.5 mg/dL Final     Assessment/Plan    No diagnosis found..   1.  Abdominal pain with constipation, likely due to irritable bowel syndrome.    Continue MiraLAX 17 g p.o. daily and High-fiber diet.   Add Linzess 290 mcg p.o. daily.  Repeat colonoscopy in 5 years.  2.  Abdominal pain with weight  loss, nausea and vomiting, improving.  Continue Prilosec 40 mg p.o. daily.    Gastric emptying scan if symptoms recur  3.  Family history of colon cancer, patient needs high risk screening for colorectal neoplasia.    Repeat colonoscopy in 5 years.  4.    Mcneil's esophagus, continue PPI.  Antireflux lifestyle.  Schedule repeat EGD in next visit.  5.  Weight loss, stabilized.  Add p.o. nutritional supplements.  Patient counseled.  6.  Tobacco usage, recommend cessation.    Orders placed during this encounter include:  No orders of the defined types were placed in this encounter.      * Surgery not found *    Review and/or summary of lab tests, radiology, procedures, medications. Review and summary of old records and obtaining of history. The risks and benefits of my recommendations, as well as other treatment options were discussed with the patient today. Questions were answered.    New Medications Ordered This Visit   Medications   • linaclotide (LINZESS) 290 MCG capsule capsule     Sig: Take 1 capsule by mouth Every Morning Before Breakfast.     Dispense:  30 capsule     Refill:  5   • omeprazole (priLOSEC) 40 MG capsule     Sig: Take 1 capsule by mouth Daily.     Dispense:  30 capsule     Refill:  11       Follow-up: Return in about 1 month (around 4/8/2022).               Results for orders placed or performed in visit on 02/01/22   Phosphorus    Specimen: Blood   Result Value Ref Range    Phosphorus 5.1 (H) 2.5 - 4.5 mg/dL   Magnesium    Specimen: Blood   Result Value Ref Range    Magnesium 1.7 1.6 - 2.6 mg/dL   Comprehensive Metabolic Panel    Specimen: Blood   Result Value Ref Range    Glucose 130 (H) 65 - 99 mg/dL    BUN 8 6 - 20 mg/dL    Creatinine 0.66 0.57 - 1.00 mg/dL    Sodium 141 136 - 145 mmol/L    Potassium 4.0 3.5 - 5.2 mmol/L    Chloride 104 98 - 107 mmol/L    CO2 26.0 22.0 - 29.0 mmol/L    Calcium 9.5 8.6 - 10.5 mg/dL    Total Protein 6.8 6.0 - 8.5 g/dL    Albumin 4.00 3.50 - 5.20 g/dL    ALT  (SGPT) 22 1 - 33 U/L    AST (SGOT) 36 (H) 1 - 32 U/L    Alkaline Phosphatase 144 (H) 39 - 117 U/L    Total Bilirubin 0.4 0.0 - 1.2 mg/dL    eGFR Non African Amer 93 >60 mL/min/1.73    Globulin 2.8 gm/dL    A/G Ratio 1.4 g/dL    BUN/Creatinine Ratio 12.1 7.0 - 25.0    Anion Gap 11.0 5.0 - 15.0 mmol/L   Results for orders placed or performed in visit on 12/16/21   Phosphorus    Specimen: Blood   Result Value Ref Range    Phosphorus 2.9 2.5 - 4.5 mg/dL   Magnesium    Specimen: Blood   Result Value Ref Range    Magnesium 1.7 1.6 - 2.6 mg/dL   Comprehensive metabolic panel    Specimen: Blood   Result Value Ref Range    Glucose 105 (H) 65 - 99 mg/dL    BUN 8 6 - 20 mg/dL    Creatinine 0.51 (L) 0.57 - 1.00 mg/dL    Sodium 139 136 - 145 mmol/L    Potassium 3.6 3.5 - 5.2 mmol/L    Chloride 103 98 - 107 mmol/L    CO2 28.0 22.0 - 29.0 mmol/L    Calcium 8.7 8.6 - 10.5 mg/dL    Total Protein 6.5 6.0 - 8.5 g/dL    Albumin 4.20 3.50 - 5.20 g/dL    ALT (SGPT) 18 1 - 33 U/L    AST (SGOT) 28 1 - 32 U/L    Alkaline Phosphatase 122 (H) 39 - 117 U/L    Total Bilirubin 0.4 0.0 - 1.2 mg/dL    eGFR Non African Amer 125 >60 mL/min/1.73    Globulin 2.3 gm/dL    A/G Ratio 1.8 g/dL    BUN/Creatinine Ratio 15.7 7.0 - 25.0    Anion Gap 8.0 5.0 - 15.0 mmol/L   Results for orders placed or performed in visit on 06/14/21   Vitamin D 25 Hydroxy    Specimen: Blood   Result Value Ref Range    25 Hydroxy, Vitamin D 54.1 ng/ml   Results for orders placed or performed in visit on 02/25/21   BUN    Specimen: Blood   Result Value Ref Range    BUN 6 6 - 20 mg/dL   Creatinine, Serum    Specimen: Blood   Result Value Ref Range    Creatinine 0.40 (L) 0.57 - 1.00 mg/dL    eGFR Non African Amer >150 >60 mL/min/1.73   Results for orders placed or performed during the hospital encounter of 02/09/21   Tissue Pathology Exam    Specimen: A: Small Intestine, Duodenum; Tissue    B: Gastric, Antrum; Tissue    C: Esophagus; Tissue   Result Value Ref Range    Case Report        Surgical Pathology Report                         Case: [id number]                                  Authorizing Provider:  Diane Haynes MD      Collected:           02/09/2021 02:22 PM          Ordering Location:     [facility]             Received:            02/10/2021 06:42 AM                                 [facility]                                                     Pathologist:           Farhat Velasco MD                                                          Specimens:   1) - Small Intestine, Duodenum, duodenum bx                                                         2) - Gastric, Antrum, antrum bx                                                                     3) - Esophagus, eg junction bx                                                             Final Diagnosis       SEE SCANNED REPORT       Results for orders placed or performed in visit on 02/06/21   COVID-19,[facility], NP/OP SWAB IN UTM/VTM/[facility] TRANSPORT MEDIA,24 HR TAT - Swab, Nasopharynx    Specimen: Nasopharynx; Swab   Result Value Ref Range    COVID19 Not Detected Not Detected - Ref. Range   Results for orders placed or performed in visit on 12/14/20   CBC Auto Differential    Specimen: Arm, Right; Blood   Result Value Ref Range    WBC 8.55 3.40 - 10.80 10*3/mm3    RBC 4.11 3.77 - 5.28 10*6/mm3    Hemoglobin 13.5 12.0 - 15.9 g/dL    Hematocrit 39.7 34.0 - 46.6 %    MCV 96.6 79.0 - 97.0 fL    MCH 32.8 26.6 - 33.0 pg    MCHC 34.0 31.5 - 35.7 g/dL    RDW 12.1 (L) 12.3 - 15.4 %    RDW-SD 42.6 37.0 - 54.0 fl    MPV 9.5 6.0 - 12.0 fL    Platelets 216 140 - 450 10*3/mm3    Neutrophil % 58.0 42.7 - 76.0 %    Lymphocyte % 31.7 19.6 - 45.3 %    Monocyte % 8.0 5.0 - 12.0 %    Eosinophil % 1.3 0.3 - 6.2 %    Basophil % 0.6 0.0 - 1.5 %    Immature Grans % 0.4 0.0 - 0.5 %    Neutrophils, Absolute 4.97 1.70 - 7.00 10*3/mm3    Lymphocytes, Absolute 2.71 0.70 - 3.10 10*3/mm3    Monocytes, Absolute 0.68  0.10 - 0.90 10*3/mm3    Eosinophils, Absolute 0.11 0.00 - 0.40 10*3/mm3    Basophils, Absolute 0.05 0.00 - 0.20 10*3/mm3    Immature Grans, Absolute 0.03 0.00 - 0.05 10*3/mm3    nRBC 0.0 0.0 - 0.2 /100 WBC   Phosphorus    Specimen: Arm, Right; Blood   Result Value Ref Range    Phosphorus 3.6 2.5 - 4.5 mg/dL   Magnesium    Specimen: Arm, Right; Blood   Result Value Ref Range    Magnesium 1.5 (L) 1.6 - 2.6 mg/dL   Comprehensive Metabolic Panel    Specimen: Arm, Right; Blood   Result Value Ref Range    Glucose 124 (H) 65 - 99 mg/dL    BUN 8 6 - 20 mg/dL    Creatinine 0.48 (L) 0.57 - 1.00 mg/dL    Sodium 140 136 - 145 mmol/L    Potassium 3.7 3.5 - 5.2 mmol/L    Chloride 106 98 - 107 mmol/L    CO2 24.0 22.0 - 29.0 mmol/L    Calcium 9.1 8.6 - 10.5 mg/dL    Total Protein 6.8 6.0 - 8.5 g/dL    Albumin 4.00 3.50 - 5.20 g/dL    ALT (SGPT) 24 1 - 33 U/L    AST (SGOT) 28 1 - 32 U/L    Alkaline Phosphatase 123 (H) 39 - 117 U/L    Total Bilirubin 0.5 0.0 - 1.2 mg/dL    eGFR Non African Amer 134 >60 mL/min/1.73    Globulin 2.8 gm/dL    A/G Ratio 1.4 g/dL    BUN/Creatinine Ratio 16.7 7.0 - 25.0    Anion Gap 10.0 5.0 - 15.0 mmol/L   Results for orders placed or performed in visit on 11/02/20   CBC Auto Differential    Specimen: Blood   Result Value Ref Range    WBC 7.22 3.40 - 10.80 10*3/mm3    RBC 4.26 3.77 - 5.28 10*6/mm3    Hemoglobin 13.4 12.0 - 15.9 g/dL    Hematocrit 40.5 34.0 - 46.6 %    MCV 95.1 79.0 - 97.0 fL    MCH 31.5 26.6 - 33.0 pg    MCHC 33.1 31.5 - 35.7 g/dL    RDW 12.2 (L) 12.3 - 15.4 %    RDW-SD 42.3 37.0 - 54.0 fl    MPV 9.6 6.0 - 12.0 fL    Platelets 242 140 - 450 10*3/mm3    Neutrophil % 56.5 42.7 - 76.0 %    Lymphocyte % 31.0 19.6 - 45.3 %    Monocyte % 9.1 5.0 - 12.0 %    Eosinophil % 2.4 0.3 - 6.2 %    Basophil % 0.7 0.0 - 1.5 %    Immature Grans % 0.3 0.0 - 0.5 %    Neutrophils, Absolute 4.08 1.70 - 7.00 10*3/mm3    Lymphocytes, Absolute 2.24 0.70 - 3.10 10*3/mm3    Monocytes, Absolute 0.66 0.10 - 0.90  10*3/mm3    Eosinophils, Absolute 0.17 0.00 - 0.40 10*3/mm3    Basophils, Absolute 0.05 0.00 - 0.20 10*3/mm3    Immature Grans, Absolute 0.02 0.00 - 0.05 10*3/mm3    nRBC 0.0 0.0 - 0.2 /100 WBC     *Note: Due to a large number of results and/or encounters for the requested time period, some results have not been displayed. A complete set of results can be found in Results Review.         This document has been electronically signed by Diane Haynes MD on April 2, 2022 23:10 CDT

## 2022-04-08 ENCOUNTER — OFFICE VISIT (OUTPATIENT)
Dept: GASTROENTEROLOGY | Facility: CLINIC | Age: 57
End: 2022-04-08

## 2022-04-08 VITALS
SYSTOLIC BLOOD PRESSURE: 145 MMHG | DIASTOLIC BLOOD PRESSURE: 81 MMHG | BODY MASS INDEX: 15.9 KG/M2 | HEART RATE: 89 BPM | HEIGHT: 61 IN | WEIGHT: 84.2 LBS

## 2022-04-08 DIAGNOSIS — K22.70 BARRETT'S ESOPHAGUS WITHOUT DYSPLASIA: Primary | ICD-10-CM

## 2022-04-08 PROCEDURE — 99442 PR PHYS/QHP TELEPHONE EVALUATION 11-20 MIN: CPT | Performed by: INTERNAL MEDICINE

## 2022-04-08 RX ORDER — DEXTROSE AND SODIUM CHLORIDE 5; .45 G/100ML; G/100ML
30 INJECTION, SOLUTION INTRAVENOUS CONTINUOUS PRN
Status: CANCELLED | OUTPATIENT
Start: 2022-04-22

## 2022-04-19 ENCOUNTER — LAB (OUTPATIENT)
Dept: LAB | Facility: HOSPITAL | Age: 57
End: 2022-04-19

## 2022-04-19 DIAGNOSIS — K22.70 BARRETT'S ESOPHAGUS WITHOUT DYSPLASIA: ICD-10-CM

## 2022-04-19 LAB — SARS-COV-2 N GENE RESP QL NAA+PROBE: NOT DETECTED

## 2022-04-19 PROCEDURE — C9803 HOPD COVID-19 SPEC COLLECT: HCPCS

## 2022-04-19 PROCEDURE — 87635 SARS-COV-2 COVID-19 AMP PRB: CPT

## 2022-04-22 ENCOUNTER — HOSPITAL ENCOUNTER (OUTPATIENT)
Facility: HOSPITAL | Age: 57
Setting detail: HOSPITAL OUTPATIENT SURGERY
Discharge: HOME | End: 2022-04-22
Attending: INTERNAL MEDICINE | Admitting: INTERNAL MEDICINE

## 2022-04-22 ENCOUNTER — ANESTHESIA (OUTPATIENT)
Dept: GASTROENTEROLOGY | Facility: HOSPITAL | Age: 57
End: 2022-04-22

## 2022-04-22 ENCOUNTER — ANESTHESIA EVENT (OUTPATIENT)
Dept: GASTROENTEROLOGY | Facility: HOSPITAL | Age: 57
End: 2022-04-22

## 2022-04-22 VITALS
HEART RATE: 88 BPM | TEMPERATURE: 97.7 F | WEIGHT: 82 LBS | DIASTOLIC BLOOD PRESSURE: 53 MMHG | RESPIRATION RATE: 18 BRPM | HEIGHT: 61 IN | BODY MASS INDEX: 15.48 KG/M2 | OXYGEN SATURATION: 96 % | SYSTOLIC BLOOD PRESSURE: 98 MMHG

## 2022-04-22 DIAGNOSIS — K22.70 BARRETT'S ESOPHAGUS WITHOUT DYSPLASIA: ICD-10-CM

## 2022-04-22 PROCEDURE — 88305 TISSUE EXAM BY PATHOLOGIST: CPT

## 2022-04-22 PROCEDURE — 25010000002 PROPOFOL 10 MG/ML EMULSION: Performed by: NURSE ANESTHETIST, CERTIFIED REGISTERED

## 2022-04-22 PROCEDURE — 43239 EGD BIOPSY SINGLE/MULTIPLE: CPT | Performed by: INTERNAL MEDICINE

## 2022-04-22 RX ORDER — LIDOCAINE HYDROCHLORIDE 20 MG/ML
INJECTION, SOLUTION EPIDURAL; INFILTRATION; INTRACAUDAL; PERINEURAL AS NEEDED
Status: DISCONTINUED | OUTPATIENT
Start: 2022-04-22 | End: 2022-04-22 | Stop reason: SURG

## 2022-04-22 RX ORDER — PROPOFOL 10 MG/ML
VIAL (ML) INTRAVENOUS AS NEEDED
Status: DISCONTINUED | OUTPATIENT
Start: 2022-04-22 | End: 2022-04-22 | Stop reason: SURG

## 2022-04-22 RX ORDER — DEXTROSE AND SODIUM CHLORIDE 5; .45 G/100ML; G/100ML
30 INJECTION, SOLUTION INTRAVENOUS CONTINUOUS PRN
Status: DISCONTINUED | OUTPATIENT
Start: 2022-04-22 | End: 2022-04-22 | Stop reason: HOSPADM

## 2022-04-22 RX ORDER — ONDANSETRON 2 MG/ML
4 INJECTION INTRAMUSCULAR; INTRAVENOUS ONCE AS NEEDED
Status: DISCONTINUED | OUTPATIENT
Start: 2022-04-22 | End: 2022-04-22 | Stop reason: HOSPADM

## 2022-04-22 RX ADMIN — PROPOFOL 10 MG: 10 INJECTION, EMULSION INTRAVENOUS at 13:01

## 2022-04-22 RX ADMIN — LIDOCAINE HYDROCHLORIDE 70 MG: 20 INJECTION, SOLUTION EPIDURAL; INFILTRATION; INTRACAUDAL; PERINEURAL at 12:57

## 2022-04-22 RX ADMIN — PROPOFOL 70 MG: 10 INJECTION, EMULSION INTRAVENOUS at 12:57

## 2022-04-22 RX ADMIN — DEXTROSE AND SODIUM CHLORIDE 30 ML/HR: 5; 450 INJECTION, SOLUTION INTRAVENOUS at 12:39

## 2022-04-22 RX ADMIN — PROPOFOL 10 MG: 10 INJECTION, EMULSION INTRAVENOUS at 12:59

## 2022-04-22 NOTE — H&P
No chief complaint on file.      Subjective    Muna Zayas is a 57 y.o. female.    History of Present Illness  Patient presented to GI clinic for follow-up visit today.  Has intermittent symptoms with abdominal pain, constipation and fatigue.  Denied nausea or vomiting.  Weight is stable.  EGD last year was consistent with hiatal hernia, esophagitis and gastritis.  Path was consistent with Mcneil's. Colonoscopy was consistent with diverticulosis and hemorrhoids.       The following portions of the patient's history were reviewed and updated as appropriate:   Past Medical History:   Diagnosis Date   • COPD (chronic obstructive pulmonary disease) (HCC)    • Coronary artery disease    • Hypertension      Past Surgical History:   Procedure Laterality Date   • COLONOSCOPY N/A 2021    Procedure: COLONOSCOPY;  Surgeon: Diane Haynes MD;  Location: [facility] ENDOSCOPY;  Service: Gastroenterology;  Laterality: N/A;   • CORONARY ANGIOPLASTY WITH STENT PLACEMENT     • ENDOSCOPY N/A 2021    Procedure: ESOPHAGOGASTRODUODENOSCOPY;  Surgeon: Diane Haynes MD;  Location: [facility] ENDOSCOPY;  Service: Gastroenterology;  Laterality: N/A;   • NASAL SEPTUM SURGERY     • UPPER GASTROINTESTINAL ENDOSCOPY  2021     Family History   Problem Relation Age of Onset   • Colon cancer Paternal Grandmother      OB History        3    Para   3    Term   3            AB        Living           SAB        IAB        Ectopic        Molar        Multiple        Live Births                  Prior to Admission medications    Medication Sig Start Date End Date Taking? Authorizing Provider   aspirin 81 MG chewable tablet Chew 81 mg Daily.   Yes Alis Richard MD   atorvastatin (LIPITOR) 10 MG tablet Take 10 mg by mouth Daily. 21  Yes Alis Richard MD   calcium carbonate, oyster shell, 500 MG tablet tablet TAKE 1 TABLET BY MOUTH DAILY WITH A MEAL 22  Yes Alis Richard MD    CALCIUM-VITAMIN D PO Take 1 tablet by mouth Daily.   Yes Alis Richard MD   Cartia  MG 24 hr capsule Take 240 mg by mouth Daily. 1/18/21  Yes Alis Richard MD   cholecalciferol (VITAMIN D3) 10 MCG (400 UNIT) tablet Take 400 Units by mouth Daily.   Yes Alis Richard MD   denosumab (Prolia) 60 MG/ML solution prefilled syringe syringe Inject 60 mg under the skin into the appropriate area as directed Every 6 (Six) Months.   Yes Alis Richard MD   dilTIAZem (TIAZAC) 240 MG 24 hr capsule Take 240 mg by mouth Daily. 9/21/21  Yes Alis Richard MD   isosorbide mononitrate (IMDUR) 30 MG 24 hr tablet Take 30 mg by mouth 2 (Two) Times a Day. 1/18/21  Yes Alis Richard MD   nitroglycerin (NITROSTAT) 0.4 MG SL tablet PLACE 1 TABLET UNDER TONGUE EVERY FIVE MINUTES (MAX. OF 3) IF NO RELIEF SEEK ER 12/31/21  Yes Alis Richard MD   linaclotide (LINZESS) 290 MCG capsule capsule Take 1 capsule by mouth Every Morning Before Breakfast. 3/8/22   Diane Haynes MD   omeprazole (priLOSEC) 40 MG capsule Take 1 capsule by mouth Daily. 3/8/22   Diane Haynes MD     Allergies   Allergen Reactions   • Doxycycline Swelling     Face and hands swell   • Penicillins Swelling     Facial and throat swelling     Social History     Socioeconomic History   • Marital status: Single   Tobacco Use   • Smoking status: Current Every Day Smoker     Packs/day: 2.00     Years: 42.00     Pack years: 84.00     Types: Cigarettes   • Smokeless tobacco: Never Used   • Tobacco comment: Working with program in [place] to stop smoking   Vaping Use   • Vaping Use: Former   • Substances: Nicotine   • Devices: 03/08/2022 - Patient states device was refillable.   Substance and Sexual Activity   • Alcohol use: Yes     Comment: Patient confirmed she consumes 2 Beer per night to aid in sleep and drinks to celebrate birthdays.   • Drug use: Never   • Sexual activity: Defer       Review of  "Systems  Review of Systems   Constitutional: Positive for fatigue. Negative for chills, fever and unexpected weight change.   HENT: Negative for congestion, ear discharge, hearing loss, nosebleeds and sore throat.    Eyes: Negative for pain, discharge and redness.   Respiratory: Negative for cough, chest tightness, shortness of breath and wheezing.    Cardiovascular: Negative for chest pain and palpitations.   Gastrointestinal: Positive for abdominal pain and constipation. Negative for abdominal distention, blood in stool, diarrhea, nausea and vomiting.   Endocrine: Negative for cold intolerance, polydipsia, polyphagia and polyuria.   Genitourinary: Negative for dysuria, flank pain, frequency, hematuria and urgency.   Musculoskeletal: Negative for arthralgias, back pain, joint swelling and myalgias.   Skin: Negative for color change, pallor and rash.   Neurological: Negative for tremors, seizures, syncope, weakness and headaches.   Hematological: Negative for adenopathy. Does not bruise/bleed easily.   Psychiatric/Behavioral: Negative for behavioral problems, confusion, dysphoric mood, hallucinations and suicidal ideas. The patient is not nervous/anxious.         /65 (BP Location: Left arm, Patient Position: Lying)   Pulse 84   Temp 97.3 °F (36.3 °C) (Temporal)   Resp 18   Ht 154.9 cm (61\")   Wt 37.2 kg (82 lb)   SpO2 96%   BMI 15.49 kg/m²     Objective    Physical Exam  Constitutional:       Appearance: She is well-developed.   HENT:      Head: Normocephalic and atraumatic.   Eyes:      Conjunctiva/sclera: Conjunctivae normal.      Pupils: Pupils are equal, round, and reactive to light.   Neck:      Thyroid: No thyromegaly.   Cardiovascular:      Rate and Rhythm: Normal rate and regular rhythm.      Heart sounds: Normal heart sounds. No murmur heard.  Pulmonary:      Effort: Pulmonary effort is normal.      Breath sounds: Normal breath sounds. No wheezing.   Abdominal:      General: Bowel sounds are " normal. There is no distension.      Palpations: Abdomen is soft. There is no mass.      Tenderness: There is no abdominal tenderness.      Hernia: No hernia is present.   Genitourinary:     Comments: No lesions noted  Musculoskeletal:         General: No tenderness. Normal range of motion.      Cervical back: Normal range of motion and neck supple.   Lymphadenopathy:      Cervical: No cervical adenopathy.   Skin:     General: Skin is warm and dry.      Findings: No rash.   Neurological:      Mental Status: She is alert and oriented to person, place, and time.      Cranial Nerves: No cranial nerve deficit.   Psychiatric:         Thought Content: Thought content normal.       Lab on 02/01/2022   Component Date Value Ref Range Status   • Glucose 02/01/2022 130 (A) 65 - 99 mg/dL Final   • BUN 02/01/2022 8  6 - 20 mg/dL Final   • Creatinine 02/01/2022 0.66  0.57 - 1.00 mg/dL Final   • Sodium 02/01/2022 141  136 - 145 mmol/L Final   • Potassium 02/01/2022 4.0  3.5 - 5.2 mmol/L Final   • Chloride 02/01/2022 104  98 - 107 mmol/L Final   • CO2 02/01/2022 26.0  22.0 - 29.0 mmol/L Final   • Calcium 02/01/2022 9.5  8.6 - 10.5 mg/dL Final   • Total Protein 02/01/2022 6.8  6.0 - 8.5 g/dL Final   • Albumin 02/01/2022 4.00  3.50 - 5.20 g/dL Final   • ALT (SGPT) 02/01/2022 22  1 - 33 U/L Final   • AST (SGOT) 02/01/2022 36 (A) 1 - 32 U/L Final   • Alkaline Phosphatase 02/01/2022 144 (A) 39 - 117 U/L Final   • Total Bilirubin 02/01/2022 0.4  0.0 - 1.2 mg/dL Final   • eGFR Non African Amer 02/01/2022 93  >60 mL/min/1.73 Final   • Globulin 02/01/2022 2.8  gm/dL Final   • A/G Ratio 02/01/2022 1.4  g/dL Final   • BUN/Creatinine Ratio 02/01/2022 12.1  7.0 - 25.0 Final   • Anion Gap 02/01/2022 11.0  5.0 - 15.0 mmol/L Final   • Magnesium 02/01/2022 1.7  1.6 - 2.6 mg/dL Final   • Phosphorus 02/01/2022 5.1 (A) 2.5 - 4.5 mg/dL Final     Assessment/Plan      1. Mcneil's esophagus without dysplasia    .   1.  Abdominal pain with constipation,  likely due to irritable bowel syndrome.    Continue MiraLAX 17 g p.o. daily and High-fiber diet.   Add Linzess 290 mcg p.o. daily.  Repeat colonoscopy in 5 years.  2.  Abdominal pain with weight loss, nausea and vomiting, improving.  Continue Prilosec 40 mg p.o. daily.    Gastric emptying scan if symptoms recur  3.  Family history of colon cancer, patient needs high risk screening for colorectal neoplasia.    Repeat colonoscopy in 5 years.  4.    Mcneil's esophagus, continue PPI.  Antireflux lifestyle.  Schedule repeat EGD in next visit.  5.  Weight loss, stabilized.  Add p.o. nutritional supplements.  Patient counseled.  6.  Tobacco usage, recommend cessation.    Orders placed during this encounter include:  Orders Placed This Encounter   Procedures   • Implement Anesthesia Orders Day of Procedure     Standing Status:   Standing     Number of Occurrences:   1   • Obtain Informed Consent     Standing Status:   Standing     Number of Occurrences:   1     Order Specific Question:   Informed Consent Given For     Answer:   egd   • POC Glucose Once     Prior to Procedure on ALL Diabetic Patients     Standing Status:   Standing     Number of Occurrences:   1   • Insert Peripheral IV     Standing Status:   Standing     Number of Occurrences:   1       ESOPHAGOGASTRODUODENOSCOPY (N/A)    Review and/or summary of lab tests, radiology, procedures, medications. Review and summary of old records and obtaining of history. The risks and benefits of my recommendations, as well as other treatment options were discussed with the patient today. Questions were answered.    New Medications Ordered This Visit   Medications   • dextrose 5 % and sodium chloride 0.45 % infusion       Follow-up: No follow-ups on file.               Results for orders placed or performed in visit on 04/19/22   COVID-19, MARIA C [facility] IN-HOUSE, NP SWAB IN TRANSPORT MEDIA 8-10 HR TAT - Swab, Nasopharynx    Specimen: Nasopharynx; Swab   Result Value Ref Range     COVID19 Not Detected Not Detected - Ref. Range   Results for orders placed or performed in visit on 02/01/22   Phosphorus    Specimen: Blood   Result Value Ref Range    Phosphorus 5.1 (H) 2.5 - 4.5 mg/dL   Magnesium    Specimen: Blood   Result Value Ref Range    Magnesium 1.7 1.6 - 2.6 mg/dL   Comprehensive Metabolic Panel    Specimen: Blood   Result Value Ref Range    Glucose 130 (H) 65 - 99 mg/dL    BUN 8 6 - 20 mg/dL    Creatinine 0.66 0.57 - 1.00 mg/dL    Sodium 141 136 - 145 mmol/L    Potassium 4.0 3.5 - 5.2 mmol/L    Chloride 104 98 - 107 mmol/L    CO2 26.0 22.0 - 29.0 mmol/L    Calcium 9.5 8.6 - 10.5 mg/dL    Total Protein 6.8 6.0 - 8.5 g/dL    Albumin 4.00 3.50 - 5.20 g/dL    ALT (SGPT) 22 1 - 33 U/L    AST (SGOT) 36 (H) 1 - 32 U/L    Alkaline Phosphatase 144 (H) 39 - 117 U/L    Total Bilirubin 0.4 0.0 - 1.2 mg/dL    eGFR Non African Amer 93 >60 mL/min/1.73    Globulin 2.8 gm/dL    A/G Ratio 1.4 g/dL    BUN/Creatinine Ratio 12.1 7.0 - 25.0    Anion Gap 11.0 5.0 - 15.0 mmol/L   Results for orders placed or performed in visit on 12/16/21   Phosphorus    Specimen: Blood   Result Value Ref Range    Phosphorus 2.9 2.5 - 4.5 mg/dL   Magnesium    Specimen: Blood   Result Value Ref Range    Magnesium 1.7 1.6 - 2.6 mg/dL   Comprehensive metabolic panel    Specimen: Blood   Result Value Ref Range    Glucose 105 (H) 65 - 99 mg/dL    BUN 8 6 - 20 mg/dL    Creatinine 0.51 (L) 0.57 - 1.00 mg/dL    Sodium 139 136 - 145 mmol/L    Potassium 3.6 3.5 - 5.2 mmol/L    Chloride 103 98 - 107 mmol/L    CO2 28.0 22.0 - 29.0 mmol/L    Calcium 8.7 8.6 - 10.5 mg/dL    Total Protein 6.5 6.0 - 8.5 g/dL    Albumin 4.20 3.50 - 5.20 g/dL    ALT (SGPT) 18 1 - 33 U/L    AST (SGOT) 28 1 - 32 U/L    Alkaline Phosphatase 122 (H) 39 - 117 U/L    Total Bilirubin 0.4 0.0 - 1.2 mg/dL    eGFR Non African Amer 125 >60 mL/min/1.73    Globulin 2.3 gm/dL    A/G Ratio 1.8 g/dL    BUN/Creatinine Ratio 15.7 7.0 - 25.0    Anion Gap 8.0 5.0 - 15.0 mmol/L    Results for orders placed or performed in visit on 06/14/21   Vitamin D 25 Hydroxy    Specimen: Blood   Result Value Ref Range    25 Hydroxy, Vitamin D 54.1 ng/ml   Results for orders placed or performed in visit on 02/25/21   BUN    Specimen: Blood   Result Value Ref Range    BUN 6 6 - 20 mg/dL   Creatinine, Serum    Specimen: Blood   Result Value Ref Range    Creatinine 0.40 (L) 0.57 - 1.00 mg/dL    eGFR Non African Amer >150 >60 mL/min/1.73   Results for orders placed or performed during the hospital encounter of 02/09/21   Tissue Pathology Exam    Specimen: A: Small Intestine, Duodenum; Tissue    B: Gastric, Antrum; Tissue    C: Esophagus; Tissue   Result Value Ref Range    Case Report       Surgical Pathology Report                         Case: [id number]                                  Authorizing Provider:  Diane Haynes MD      Collected:           02/09/2021 02:22 PM          Ordering Location:     [facility]             Received:            02/10/2021 06:42 AM                                 [facility]                                                     Pathologist:           Farhat Velasco MD                                                          Specimens:   1) - Small Intestine, Duodenum, duodenum bx                                                         2) - Gastric, Antrum, antrum bx                                                                     3) - Esophagus, eg junction bx                                                             Final Diagnosis       SEE SCANNED REPORT       Results for orders placed or performed in visit on 02/06/21   COVID-19,APTIMA PANTHER,JESSIE IN-HOUSE, NP/OP SWAB IN UTM/VTM/SALINE TRANSPORT MEDIA,24 HR TAT - Swab, Nasopharynx    Specimen: Nasopharynx; Swab   Result Value Ref Range    COVID19 Not Detected Not Detected - Ref. Range   Results for orders placed or performed in visit on 12/14/20   CBC Auto Differential    Specimen: Arm, Right;  Blood   Result Value Ref Range    WBC 8.55 3.40 - 10.80 10*3/mm3    RBC 4.11 3.77 - 5.28 10*6/mm3    Hemoglobin 13.5 12.0 - 15.9 g/dL    Hematocrit 39.7 34.0 - 46.6 %    MCV 96.6 79.0 - 97.0 fL    MCH 32.8 26.6 - 33.0 pg    MCHC 34.0 31.5 - 35.7 g/dL    RDW 12.1 (L) 12.3 - 15.4 %    RDW-SD 42.6 37.0 - 54.0 fl    MPV 9.5 6.0 - 12.0 fL    Platelets 216 140 - 450 10*3/mm3    Neutrophil % 58.0 42.7 - 76.0 %    Lymphocyte % 31.7 19.6 - 45.3 %    Monocyte % 8.0 5.0 - 12.0 %    Eosinophil % 1.3 0.3 - 6.2 %    Basophil % 0.6 0.0 - 1.5 %    Immature Grans % 0.4 0.0 - 0.5 %    Neutrophils, Absolute 4.97 1.70 - 7.00 10*3/mm3    Lymphocytes, Absolute 2.71 0.70 - 3.10 10*3/mm3    Monocytes, Absolute 0.68 0.10 - 0.90 10*3/mm3    Eosinophils, Absolute 0.11 0.00 - 0.40 10*3/mm3    Basophils, Absolute 0.05 0.00 - 0.20 10*3/mm3    Immature Grans, Absolute 0.03 0.00 - 0.05 10*3/mm3    nRBC 0.0 0.0 - 0.2 /100 WBC   Phosphorus    Specimen: Arm, Right; Blood   Result Value Ref Range    Phosphorus 3.6 2.5 - 4.5 mg/dL   Magnesium    Specimen: Arm, Right; Blood   Result Value Ref Range    Magnesium 1.5 (L) 1.6 - 2.6 mg/dL   Comprehensive Metabolic Panel    Specimen: Arm, Right; Blood   Result Value Ref Range    Glucose 124 (H) 65 - 99 mg/dL    BUN 8 6 - 20 mg/dL    Creatinine 0.48 (L) 0.57 - 1.00 mg/dL    Sodium 140 136 - 145 mmol/L    Potassium 3.7 3.5 - 5.2 mmol/L    Chloride 106 98 - 107 mmol/L    CO2 24.0 22.0 - 29.0 mmol/L    Calcium 9.1 8.6 - 10.5 mg/dL    Total Protein 6.8 6.0 - 8.5 g/dL    Albumin 4.00 3.50 - 5.20 g/dL    ALT (SGPT) 24 1 - 33 U/L    AST (SGOT) 28 1 - 32 U/L    Alkaline Phosphatase 123 (H) 39 - 117 U/L    Total Bilirubin 0.5 0.0 - 1.2 mg/dL    eGFR Non African Amer 134 >60 mL/min/1.73    Globulin 2.8 gm/dL    A/G Ratio 1.4 g/dL    BUN/Creatinine Ratio 16.7 7.0 - 25.0    Anion Gap 10.0 5.0 - 15.0 mmol/L   Results for orders placed or performed in visit on 11/02/20   CBC Auto Differential    Specimen: Blood   Result  Value Ref Range    WBC 7.22 3.40 - 10.80 10*3/mm3    RBC 4.26 3.77 - 5.28 10*6/mm3    Hemoglobin 13.4 12.0 - 15.9 g/dL    Hematocrit 40.5 34.0 - 46.6 %    MCV 95.1 79.0 - 97.0 fL    MCH 31.5 26.6 - 33.0 pg    MCHC 33.1 31.5 - 35.7 g/dL    RDW 12.2 (L) 12.3 - 15.4 %    RDW-SD 42.3 37.0 - 54.0 fl    MPV 9.6 6.0 - 12.0 fL    Platelets 242 140 - 450 10*3/mm3    Neutrophil % 56.5 42.7 - 76.0 %    Lymphocyte % 31.0 19.6 - 45.3 %    Monocyte % 9.1 5.0 - 12.0 %    Eosinophil % 2.4 0.3 - 6.2 %    Basophil % 0.7 0.0 - 1.5 %    Immature Grans % 0.3 0.0 - 0.5 %    Neutrophils, Absolute 4.08 1.70 - 7.00 10*3/mm3    Lymphocytes, Absolute 2.24 0.70 - 3.10 10*3/mm3    Monocytes, Absolute 0.66 0.10 - 0.90 10*3/mm3    Eosinophils, Absolute 0.17 0.00 - 0.40 10*3/mm3    Basophils, Absolute 0.05 0.00 - 0.20 10*3/mm3    Immature Grans, Absolute 0.02 0.00 - 0.05 10*3/mm3    nRBC 0.0 0.0 - 0.2 /100 WBC     *Note: Due to a large number of results and/or encounters for the requested time period, some results have not been displayed. A complete set of results can be found in Results Review.         This document has been electronically signed by Diane Haynes MD on April 22, 2022 12:32 CDT

## 2022-04-22 NOTE — ANESTHESIA PREPROCEDURE EVALUATION
Anesthesia Evaluation     Patient summary reviewed and Nursing notes reviewed   NPO Solid Status: > 8 hours  NPO Liquid Status: > 6 hours           Airway   Mallampati: II  TM distance: >3 FB  Neck ROM: full  No difficulty expected  Dental    (+) upper dentures    Pulmonary - normal exam   (+) a smoker Current Smoked day of surgery, COPD moderate,   Cardiovascular - normal exam    (+) hypertension well controlled less than 2 medications, CAD,       Neuro/Psych- negative ROS  GI/Hepatic/Renal/Endo - negative ROS     ROS Comment: Weight loss    Musculoskeletal     (+) arthralgias,   Abdominal  - normal exam   Substance History - negative use     OB/GYN negative ob/gyn ROS         Other   arthritis,                        Anesthesia Plan    ASA 3     MAC     intravenous induction     Anesthetic plan, all risks, benefits, and alternatives have been provided, discussed and informed consent has been obtained with: patient.

## 2022-04-22 NOTE — ANESTHESIA POSTPROCEDURE EVALUATION
Patient: Muna Zayas    Procedure Summary     Date: 04/22/22 Room / Location: [facility] ENDOSCOPY 1 / [facility] ENDOSCOPY    Anesthesia Start: 1254 Anesthesia Stop: 1304    Procedure: ESOPHAGOGASTRODUODENOSCOPY (N/A ) Diagnosis:       Mcneil's esophagus without dysplasia      (Mcneil's esophagus without dysplasia [K22.70])    Surgeons: Diane Haynes MD Provider: Merced Ye CRNA    Anesthesia Type: MAC ASA Status: 3          Anesthesia Type: MAC    Vitals  No vitals data found for the desired time range.          Post Anesthesia Care and Evaluation    Patient location during evaluation: bedside  Patient participation: complete - patient participated  Level of consciousness: awake  Pain score: 0  Pain management: adequate  Airway patency: patent  Anesthetic complications: No anesthetic complications  PONV Status: none  Cardiovascular status: acceptable  Respiratory status: acceptable  Hydration status: acceptable    Comments: ---------------------------               04/22/22                      1227         ---------------------------   BP:          133/65         Pulse:         84           Resp:          18           Temp:   97.3 °F (36.3 °C)   SpO2:          96%         ---------------------------

## 2022-04-26 LAB
LAB AP CASE REPORT: NORMAL
PATH REPORT.FINAL DX SPEC: NORMAL

## 2022-04-29 ENCOUNTER — OFFICE VISIT (OUTPATIENT)
Dept: GASTROENTEROLOGY | Facility: CLINIC | Age: 57
End: 2022-04-29

## 2022-04-29 VITALS
HEIGHT: 62 IN | HEART RATE: 75 BPM | DIASTOLIC BLOOD PRESSURE: 76 MMHG | WEIGHT: 83.4 LBS | SYSTOLIC BLOOD PRESSURE: 148 MMHG | BODY MASS INDEX: 15.35 KG/M2

## 2022-04-29 DIAGNOSIS — K59.09 OTHER CONSTIPATION: Primary | ICD-10-CM

## 2022-04-29 PROCEDURE — 99213 OFFICE O/P EST LOW 20 MIN: CPT | Performed by: INTERNAL MEDICINE

## 2022-04-29 RX ORDER — CLARITHROMYCIN 500 MG/1
500 TABLET, COATED ORAL 2 TIMES DAILY
Qty: 28 TABLET | Refills: 0 | Status: SHIPPED | OUTPATIENT
Start: 2022-04-29 | End: 2022-06-03

## 2022-04-29 RX ORDER — LANSOPRAZOLE 30 MG/1
30 CAPSULE, DELAYED RELEASE ORAL 2 TIMES DAILY
Qty: 28 CAPSULE | Refills: 0 | Status: SHIPPED | OUTPATIENT
Start: 2022-04-29 | End: 2022-05-13

## 2022-04-29 RX ORDER — METRONIDAZOLE 500 MG/1
500 TABLET ORAL 3 TIMES DAILY
Qty: 42 TABLET | Refills: 0 | Status: SHIPPED | OUTPATIENT
Start: 2022-04-29 | End: 2022-05-13

## 2022-04-29 NOTE — PROGRESS NOTES
Chief Complaint   Patient presents with   • EGD Performed 2022     Mcneil's Esophagus Without Dysplasia       Subjective    Muna Zayas is a 57 y.o. female.    History of Present Illness  Patient presented to the GI clinic for follow-up visit today.  Has intermittent symptoms with sore throat, ear pain and tooth ache.  Bowel movements are improving some.  Has intermittent constipation.  Abdominal pain has improved.  Denied nausea or vomiting.  EGD was consistent with hiatal hernia, esophagitis and gastritis.  Path was consistent with H. pylori gastritis and reflux esophagitis.       The following portions of the patient's history were reviewed and updated as appropriate:   Past Medical History:   Diagnosis Date   • COPD (chronic obstructive pulmonary disease) (HCC)    • Coronary artery disease    • Hypertension      Past Surgical History:   Procedure Laterality Date   • COLONOSCOPY N/A 2021    Procedure: COLONOSCOPY;  Surgeon: Diane Haynes MD;  Location: [facility] ENDOSCOPY;  Service: Gastroenterology;  Laterality: N/A;   • CORONARY ANGIOPLASTY WITH STENT PLACEMENT     • ENDOSCOPY N/A 2021    Procedure: ESOPHAGOGASTRODUODENOSCOPY;  Surgeon: Diane Haynes MD;  Location: [facility] ENDOSCOPY;  Service: Gastroenterology;  Laterality: N/A;   • ENDOSCOPY  2022   • NASAL SEPTUM SURGERY     • UPPER GASTROINTESTINAL ENDOSCOPY  2021     Family History   Problem Relation Age of Onset   • Colon cancer Paternal Grandmother      OB History        3    Para   3    Term   3            AB        Living           SAB        IAB        Ectopic        Molar        Multiple        Live Births                  Prior to Admission medications    Medication Sig Start Date End Date Taking? Authorizing Provider   aspirin 81 MG chewable tablet Chew 81 mg Daily.   Yes Provider, MD Alis   atorvastatin (LIPITOR) 10 MG tablet Take 10 mg by mouth Daily. 21  Yes Provider,  MD Linda Snell Ellipta 100-25 MCG/INH inhaler Inhale 1 puff Daily. 4/4/22  Yes Alis Richard MD   CALCIUM-VITAMIN D PO Take 1 tablet by mouth Daily.   Yes Alis Richard MD   denosumab (Prolia) 60 MG/ML solution prefilled syringe syringe Inject 60 mg under the skin into the appropriate area as directed Every 6 (Six) Months.   Yes Alis Richard MD   dilTIAZem (TIAZAC) 240 MG 24 hr capsule Take 240 mg by mouth Daily. 9/21/21  Yes Alis Richard MD   isosorbide mononitrate (IMDUR) 30 MG 24 hr tablet Take 30 mg by mouth 2 (Two) Times a Day. 1/18/21  Yes Alis Richard MD   linaclotide (LINZESS) 290 MCG capsule capsule Take 1 capsule by mouth Every Morning Before Breakfast. 3/8/22  Yes Diane Haynes MD   nitroglycerin (NITROSTAT) 0.4 MG SL tablet PLACE 1 TABLET UNDER TONGUE EVERY FIVE MINUTES (MAX. OF 3) IF NO RELIEF SEEK ER 12/31/21  Yes Alis Richard MD   omeprazole (priLOSEC) 40 MG capsule Take 1 capsule by mouth Daily. 3/8/22  Yes Diane Haynes MD   ProAir  (90 Base) MCG/ACT inhaler Inhale 1 puff Every 4 (Four) Hours As Needed for Wheezing or Shortness of Air. 4/4/22  Yes Alis Richard MD   clarithromycin (BIAXIN) 500 MG tablet Take 1 tablet by mouth 2 (Two) Times a Day. 4/29/22   Diane Haynes MD   lansoprazole (PREVACID) 30 MG capsule Take 1 capsule by mouth 2 (Two) Times a Day for 14 days. 4/29/22 5/13/22  Diane Haynes MD   metroNIDAZOLE (FLAGYL) 500 MG tablet Take 1 tablet by mouth 3 (Three) Times a Day for 14 days. 4/29/22 5/13/22  Diane Haynes MD   calcium carbonate, oyster shell, 500 MG tablet tablet TAKE 1 TABLET BY MOUTH DAILY WITH A MEAL 2/14/22 4/29/22  Alis Richard MD   Cartia  MG 24 hr capsule Take 240 mg by mouth Daily. 1/18/21 4/29/22  Provider, MD Alis   cholecalciferol (VITAMIN D3) 10 MCG (400 UNIT) tablet Take 400 Units by mouth Daily.  4/29/22  Provider, MD Alis     Allergies    Allergen Reactions   • Doxycycline Swelling     Face and hands swell   • Penicillins Swelling     Facial and throat swelling     Social History     Socioeconomic History   • Marital status: Single   Tobacco Use   • Smoking status: Current Every Day Smoker     Packs/day: 2.00     Years: 42.00     Pack years: 84.00     Types: Cigarettes   • Smokeless tobacco: Never Used   • Tobacco comment: Working with program in [place] to stop smoking   Vaping Use   • Vaping Use: Former   • Substances: Nicotine   • Devices: 03/08/2022 - Patient states device was refillable.   Substance and Sexual Activity   • Alcohol use: Yes     Comment: Patient confirmed she consumes 2 Beer per night to aid in sleep and drinks to celebrate birthdays.   • Drug use: Never   • Sexual activity: Defer       Review of Systems  Review of Systems   Constitutional: Negative for chills, fatigue, fever and unexpected weight change.   HENT: Positive for dental problem, ear pain and sore throat. Negative for congestion, ear discharge, hearing loss and nosebleeds.    Eyes: Negative for pain, discharge and redness.   Respiratory: Negative for cough, chest tightness, shortness of breath and wheezing.    Cardiovascular: Negative for chest pain and palpitations.   Gastrointestinal: Positive for constipation. Negative for abdominal distention, abdominal pain, blood in stool, diarrhea, nausea and vomiting.   Endocrine: Negative for cold intolerance, polydipsia, polyphagia and polyuria.   Genitourinary: Negative for dysuria, flank pain, frequency, hematuria and urgency.   Musculoskeletal: Negative for arthralgias, back pain, joint swelling and myalgias.   Skin: Negative for color change, pallor and rash.   Neurological: Negative for tremors, seizures, syncope, weakness and headaches.   Hematological: Negative for adenopathy. Does not bruise/bleed easily.   Psychiatric/Behavioral: Negative for behavioral problems, confusion, dysphoric mood, hallucinations and  "suicidal ideas. The patient is not nervous/anxious.         /76   Pulse 75   Ht 157.5 cm (62\")   Wt 37.8 kg (83 lb 6.4 oz)   BMI 15.25 kg/m²     Objective    Physical Exam  Constitutional:       Appearance: She is well-developed.   HENT:      Head: Normocephalic and atraumatic.   Eyes:      Conjunctiva/sclera: Conjunctivae normal.      Pupils: Pupils are equal, round, and reactive to light.   Neck:      Thyroid: No thyromegaly.   Cardiovascular:      Rate and Rhythm: Normal rate and regular rhythm.      Heart sounds: Normal heart sounds. No murmur heard.  Pulmonary:      Effort: Pulmonary effort is normal.      Breath sounds: Normal breath sounds. No wheezing.   Abdominal:      General: Bowel sounds are normal. There is no distension.      Palpations: Abdomen is soft. There is no mass.      Tenderness: There is no abdominal tenderness.      Hernia: No hernia is present.   Genitourinary:     Comments: No lesions noted  Musculoskeletal:         General: No tenderness. Normal range of motion.      Cervical back: Normal range of motion and neck supple.   Lymphadenopathy:      Cervical: No cervical adenopathy.   Skin:     General: Skin is warm and dry.      Findings: No rash.   Neurological:      Mental Status: She is alert and oriented to person, place, and time.      Cranial Nerves: No cranial nerve deficit.   Psychiatric:         Thought Content: Thought content normal.       Admission on 04/22/2022, Discharged on 04/22/2022   Component Date Value Ref Range Status   • Case Report 04/22/2022    Final                    Value:Surgical Pathology Report                         Case: [id number]                                  Authorizing Provider:  Diane Haynes MD      Collected:           04/22/2022 01:02 PM          Ordering Location:     [facility]   Received:            04/25/2022 07:59 AM                                 [place] [facility]                                             "         Pathologist:           Kenji Guzmán MD                                                             Specimens:   1) - Gastric, Antrum, KG                                                                            2) - Esophagus, EGJ         KG                                                            • Final Diagnosis 04/22/2022    Final                    Value:This result contains rich text formatting which cannot be displayed here.     Assessment/Plan    No diagnosis found..   1.  Abdominal pain with constipation, likely due to irritable bowel syndrome.    Continue MiraLAX 17 g p.o. daily, high-fiber diet and Linzess 290 mcg p.o. daily.  Repeat colonoscopy in 5 years.  2.  Abdominal pain with weight loss, nausea and vomiting, improving.  Continue Prilosec 40 mg p.o. daily.    Gastric emptying scan if symptoms recur  3.  Family history of colon cancer, patient needs high risk screening for colorectal neoplasia.    Repeat colonoscopy in 5 years.  4.    Mcneil's esophagus, continue PPI.  Antireflux lifestyle.   Repeat EGD in 3 years.  5.  Weight loss, stabilized.  Add p.o. nutritional supplements.  Patient counseled.  6.  H. pylori gastritis, add Prevpac for 2 weeks.  7.  Tobacco usage, recommend cessation.    Orders placed during this encounter include:  No orders of the defined types were placed in this encounter.      * Surgery not found *    Review and/or summary of lab tests, radiology, procedures, medications. Review and summary of old records and obtaining of history. The risks and benefits of my recommendations, as well as other treatment options were discussed with the patient today. Questions were answered.    New Medications Ordered This Visit   Medications   • lansoprazole (PREVACID) 30 MG capsule     Sig: Take 1 capsule by mouth 2 (Two) Times a Day for 14 days.     Dispense:  28 capsule     Refill:  0   • metroNIDAZOLE (FLAGYL) 500 MG tablet     Sig: Take 1 tablet by mouth 3 (Three) Times a  Day for 14 days.     Dispense:  42 tablet     Refill:  0   • clarithromycin (BIAXIN) 500 MG tablet     Sig: Take 1 tablet by mouth 2 (Two) Times a Day.     Dispense:  28 tablet     Refill:  0       Follow-up: Return in about 1 month (around 5/29/2022).               Results for orders placed or performed during the hospital encounter of 04/22/22   Tissue Pathology Exam    Specimen: A: Gastric, Antrum; Tissue    B: Esophagus; Tissue   Result Value Ref Range    Case Report       Surgical Pathology Report                         Case: [id number]                                  Authorizing Provider:  Diane Haynes MD      Collected:           04/22/2022 01:02 PM          Ordering Location:     [facility]   Received:            04/25/2022 07:59 AM                                 [place] [facility]                                                     Pathologist:           Kenji Guzmán MD                                                             Specimens:   1) - Gastric, Antrum, KG                                                                            2) - Esophagus, EGJ         KG                                                             Final Diagnosis       See Scanned Report       Results for orders placed or performed in visit on 04/19/22   COVID-19, [facility] IN-HOUSE, NP SWAB IN TRANSPORT MEDIA 8-10 HR TAT - Swab, Nasopharynx    Specimen: Nasopharynx; Swab   Result Value Ref Range    COVID19 Not Detected Not Detected - Ref. Range   Results for orders placed or performed in visit on 02/01/22   Phosphorus    Specimen: Blood   Result Value Ref Range    Phosphorus 5.1 (H) 2.5 - 4.5 mg/dL   Magnesium    Specimen: Blood   Result Value Ref Range    Magnesium 1.7 1.6 - 2.6 mg/dL   Comprehensive Metabolic Panel    Specimen: Blood   Result Value Ref Range    Glucose 130 (H) 65 - 99 mg/dL    BUN 8 6 - 20 mg/dL    Creatinine 0.66 0.57 - 1.00 mg/dL    Sodium 141 136 - 145 mmol/L    Potassium 4.0  3.5 - 5.2 mmol/L    Chloride 104 98 - 107 mmol/L    CO2 26.0 22.0 - 29.0 mmol/L    Calcium 9.5 8.6 - 10.5 mg/dL    Total Protein 6.8 6.0 - 8.5 g/dL    Albumin 4.00 3.50 - 5.20 g/dL    ALT (SGPT) 22 1 - 33 U/L    AST (SGOT) 36 (H) 1 - 32 U/L    Alkaline Phosphatase 144 (H) 39 - 117 U/L    Total Bilirubin 0.4 0.0 - 1.2 mg/dL    eGFR Non African Amer 93 >60 mL/min/1.73    Globulin 2.8 gm/dL    A/G Ratio 1.4 g/dL    BUN/Creatinine Ratio 12.1 7.0 - 25.0    Anion Gap 11.0 5.0 - 15.0 mmol/L   Results for orders placed or performed in visit on 12/16/21   Phosphorus    Specimen: Blood   Result Value Ref Range    Phosphorus 2.9 2.5 - 4.5 mg/dL   Magnesium    Specimen: Blood   Result Value Ref Range    Magnesium 1.7 1.6 - 2.6 mg/dL   Comprehensive metabolic panel    Specimen: Blood   Result Value Ref Range    Glucose 105 (H) 65 - 99 mg/dL    BUN 8 6 - 20 mg/dL    Creatinine 0.51 (L) 0.57 - 1.00 mg/dL    Sodium 139 136 - 145 mmol/L    Potassium 3.6 3.5 - 5.2 mmol/L    Chloride 103 98 - 107 mmol/L    CO2 28.0 22.0 - 29.0 mmol/L    Calcium 8.7 8.6 - 10.5 mg/dL    Total Protein 6.5 6.0 - 8.5 g/dL    Albumin 4.20 3.50 - 5.20 g/dL    ALT (SGPT) 18 1 - 33 U/L    AST (SGOT) 28 1 - 32 U/L    Alkaline Phosphatase 122 (H) 39 - 117 U/L    Total Bilirubin 0.4 0.0 - 1.2 mg/dL    eGFR Non African Amer 125 >60 mL/min/1.73    Globulin 2.3 gm/dL    A/G Ratio 1.8 g/dL    BUN/Creatinine Ratio 15.7 7.0 - 25.0    Anion Gap 8.0 5.0 - 15.0 mmol/L   Results for orders placed or performed in visit on 06/14/21   Vitamin D 25 Hydroxy    Specimen: Blood   Result Value Ref Range    25 Hydroxy, Vitamin D 54.1 ng/ml   Results for orders placed or performed in visit on 02/25/21   BUN    Specimen: Blood   Result Value Ref Range    BUN 6 6 - 20 mg/dL   Creatinine, Serum    Specimen: Blood   Result Value Ref Range    Creatinine 0.40 (L) 0.57 - 1.00 mg/dL    eGFR Non African Amer >150 >60 mL/min/1.73   Results for orders placed or performed during the hospital  encounter of 02/09/21   Tissue Pathology Exam    Specimen: A: Small Intestine, Duodenum; Tissue    B: Gastric, Antrum; Tissue    C: Esophagus; Tissue   Result Value Ref Range    Case Report       Surgical Pathology Report                         Case: [id number]                                  Authorizing Provider:  Diane Haynes MD      Collected:           02/09/2021 02:22 PM          Ordering Location:     [facility]             Received:            02/10/2021 06:42 AM                                 [place] [facility]                                                     Pathologist:           Farhat Velasco MD                                                          Specimens:   1) - Small Intestine, Duodenum, duodenum bx                                                         2) - Gastric, Antrum, antrum bx                                                                     3) - Esophagus, eg junction bx                                                             Final Diagnosis       SEE SCANNED REPORT       Results for orders placed or performed in visit on 02/06/21   COVID-19,[facility] IN-HOUSE, NP/OP SWAB IN UTM/VTM/SALINE TRANSPORT MEDIA,24 HR TAT - Swab, Nasopharynx    Specimen: Nasopharynx; Swab   Result Value Ref Range    COVID19 Not Detected Not Detected - Ref. Range   Results for orders placed or performed in visit on 12/14/20   CBC Auto Differential    Specimen: Arm, Right; Blood   Result Value Ref Range    WBC 8.55 3.40 - 10.80 10*3/mm3    RBC 4.11 3.77 - 5.28 10*6/mm3    Hemoglobin 13.5 12.0 - 15.9 g/dL    Hematocrit 39.7 34.0 - 46.6 %    MCV 96.6 79.0 - 97.0 fL    MCH 32.8 26.6 - 33.0 pg    MCHC 34.0 31.5 - 35.7 g/dL    RDW 12.1 (L) 12.3 - 15.4 %    RDW-SD 42.6 37.0 - 54.0 fl    MPV 9.5 6.0 - 12.0 fL    Platelets 216 140 - 450 10*3/mm3    Neutrophil % 58.0 42.7 - 76.0 %    Lymphocyte % 31.7 19.6 - 45.3 %    Monocyte % 8.0 5.0 - 12.0 %    Eosinophil % 1.3 0.3 - 6.2 %     Basophil % 0.6 0.0 - 1.5 %    Immature Grans % 0.4 0.0 - 0.5 %    Neutrophils, Absolute 4.97 1.70 - 7.00 10*3/mm3    Lymphocytes, Absolute 2.71 0.70 - 3.10 10*3/mm3    Monocytes, Absolute 0.68 0.10 - 0.90 10*3/mm3    Eosinophils, Absolute 0.11 0.00 - 0.40 10*3/mm3    Basophils, Absolute 0.05 0.00 - 0.20 10*3/mm3    Immature Grans, Absolute 0.03 0.00 - 0.05 10*3/mm3    nRBC 0.0 0.0 - 0.2 /100 WBC   Phosphorus    Specimen: Arm, Right; Blood   Result Value Ref Range    Phosphorus 3.6 2.5 - 4.5 mg/dL   Magnesium    Specimen: Arm, Right; Blood   Result Value Ref Range    Magnesium 1.5 (L) 1.6 - 2.6 mg/dL   Comprehensive Metabolic Panel    Specimen: Arm, Right; Blood   Result Value Ref Range    Glucose 124 (H) 65 - 99 mg/dL    BUN 8 6 - 20 mg/dL    Creatinine 0.48 (L) 0.57 - 1.00 mg/dL    Sodium 140 136 - 145 mmol/L    Potassium 3.7 3.5 - 5.2 mmol/L    Chloride 106 98 - 107 mmol/L    CO2 24.0 22.0 - 29.0 mmol/L    Calcium 9.1 8.6 - 10.5 mg/dL    Total Protein 6.8 6.0 - 8.5 g/dL    Albumin 4.00 3.50 - 5.20 g/dL    ALT (SGPT) 24 1 - 33 U/L    AST (SGOT) 28 1 - 32 U/L    Alkaline Phosphatase 123 (H) 39 - 117 U/L    Total Bilirubin 0.5 0.0 - 1.2 mg/dL    eGFR Non African Amer 134 >60 mL/min/1.73    Globulin 2.8 gm/dL    A/G Ratio 1.4 g/dL    BUN/Creatinine Ratio 16.7 7.0 - 25.0    Anion Gap 10.0 5.0 - 15.0 mmol/L   Results for orders placed or performed in visit on 11/02/20   CBC Auto Differential    Specimen: Blood   Result Value Ref Range    WBC 7.22 3.40 - 10.80 10*3/mm3    RBC 4.26 3.77 - 5.28 10*6/mm3    Hemoglobin 13.4 12.0 - 15.9 g/dL    Hematocrit 40.5 34.0 - 46.6 %    MCV 95.1 79.0 - 97.0 fL    MCH 31.5 26.6 - 33.0 pg    MCHC 33.1 31.5 - 35.7 g/dL    RDW 12.2 (L) 12.3 - 15.4 %    RDW-SD 42.3 37.0 - 54.0 fl    MPV 9.6 6.0 - 12.0 fL    Platelets 242 140 - 450 10*3/mm3    Neutrophil % 56.5 42.7 - 76.0 %    Lymphocyte % 31.0 19.6 - 45.3 %    Monocyte % 9.1 5.0 - 12.0 %    Eosinophil % 2.4 0.3 - 6.2 %    Basophil % 0.7  0.0 - 1.5 %    Immature Grans % 0.3 0.0 - 0.5 %    Neutrophils, Absolute 4.08 1.70 - 7.00 10*3/mm3    Lymphocytes, Absolute 2.24 0.70 - 3.10 10*3/mm3    Monocytes, Absolute 0.66 0.10 - 0.90 10*3/mm3    Eosinophils, Absolute 0.17 0.00 - 0.40 10*3/mm3    Basophils, Absolute 0.05 0.00 - 0.20 10*3/mm3    Immature Grans, Absolute 0.02 0.00 - 0.05 10*3/mm3    nRBC 0.0 0.0 - 0.2 /100 WBC     *Note: Due to a large number of results and/or encounters for the requested time period, some results have not been displayed. A complete set of results can be found in Results Review.         This document has been electronically signed by Diane Haynes MD on April 29, 2022 12:27 CDT

## 2022-05-02 ENCOUNTER — LAB (OUTPATIENT)
Dept: LAB | Facility: HOSPITAL | Age: 57
End: 2022-05-02

## 2022-05-02 DIAGNOSIS — M81.0 SENILE OSTEOPOROSIS: ICD-10-CM

## 2022-05-02 LAB
ALBUMIN SERPL-MCNC: 4 G/DL (ref 3.5–5.2)
ALBUMIN/GLOB SERPL: 1.6 G/DL
ALP SERPL-CCNC: 96 U/L (ref 39–117)
ALT SERPL W P-5'-P-CCNC: 49 U/L (ref 1–33)
ANION GAP SERPL CALCULATED.3IONS-SCNC: 10 MMOL/L (ref 5–15)
AST SERPL-CCNC: 80 U/L (ref 1–32)
BILIRUB SERPL-MCNC: 0.2 MG/DL (ref 0–1.2)
BUN SERPL-MCNC: 8 MG/DL (ref 6–20)
BUN/CREAT SERPL: 17 (ref 7–25)
CALCIUM SPEC-SCNC: 8.8 MG/DL (ref 8.6–10.5)
CHLORIDE SERPL-SCNC: 111 MMOL/L (ref 98–107)
CO2 SERPL-SCNC: 21 MMOL/L (ref 22–29)
CREAT SERPL-MCNC: 0.47 MG/DL (ref 0.57–1)
EGFRCR SERPLBLD CKD-EPI 2021: 111.2 ML/MIN/1.73
GLOBULIN UR ELPH-MCNC: 2.5 GM/DL
GLUCOSE SERPL-MCNC: 117 MG/DL (ref 65–99)
MAGNESIUM SERPL-MCNC: 1.5 MG/DL (ref 1.6–2.6)
PHOSPHATE SERPL-MCNC: 3.6 MG/DL (ref 2.5–4.5)
POTASSIUM SERPL-SCNC: 3.4 MMOL/L (ref 3.5–5.2)
PROT SERPL-MCNC: 6.5 G/DL (ref 6–8.5)
SODIUM SERPL-SCNC: 142 MMOL/L (ref 136–145)

## 2022-05-02 PROCEDURE — 84100 ASSAY OF PHOSPHORUS: CPT

## 2022-05-02 PROCEDURE — 80053 COMPREHEN METABOLIC PANEL: CPT

## 2022-05-02 PROCEDURE — 83735 ASSAY OF MAGNESIUM: CPT

## 2022-05-08 NOTE — PROGRESS NOTES
Chief Complaint   Patient presents with   • 1 month f/u        Subjective    Muna Zayas is a 57 y.o. female.    History of Present Illness  Patient had a 15-minute telephone follow-up visit today due to current pandemic.  Feels some better currently.  Abdominal pain is improving.  Constipation has improved.  GERD is well controlled.  Lost 1 pound weight since last visit.  Due for EGD.       The following portions of the patient's history were reviewed and updated as appropriate:   Past Medical History:   Diagnosis Date   • COPD (chronic obstructive pulmonary disease) (HCC)    • Coronary artery disease    • Hypertension      Past Surgical History:   Procedure Laterality Date   • COLONOSCOPY N/A 2021    Procedure: COLONOSCOPY;  Surgeon: Diane Haynes MD;  Location: [facility] ENDOSCOPY;  Service: Gastroenterology;  Laterality: N/A;   • CORONARY ANGIOPLASTY WITH STENT PLACEMENT     • ENDOSCOPY N/A 2021    Procedure: ESOPHAGOGASTRODUODENOSCOPY;  Surgeon: Diane Haynes MD;  Location: [facility] ENDOSCOPY;  Service: Gastroenterology;  Laterality: N/A;   • ENDOSCOPY  2022   • ENDOSCOPY N/A 2022    Procedure: ESOPHAGOGASTRODUODENOSCOPY;  Surgeon: Diane Haynes MD;  Location: [facility] ENDOSCOPY;  Service: Gastroenterology;  Laterality: N/A;   • NASAL SEPTUM SURGERY     • UPPER GASTROINTESTINAL ENDOSCOPY  2021     Family History   Problem Relation Age of Onset   • Colon cancer Paternal Grandmother      OB History        3    Para   3    Term   3            AB        Living           SAB        IAB        Ectopic        Molar        Multiple        Live Births                  Prior to Admission medications    Medication Sig Start Date End Date Taking? Authorizing Provider   aspirin 81 MG chewable tablet Chew 81 mg Daily.   Yes Alis Richard MD   atorvastatin (LIPITOR) 10 MG tablet Take 10 mg by mouth Daily. 21  Yes ProviderAlis MD   CALCIUM-VITAMIN  D PO Take 1 tablet by mouth Daily.   Yes Alis Richard MD   denosumab (Prolia) 60 MG/ML solution prefilled syringe syringe Inject 60 mg under the skin into the appropriate area as directed Every 6 (Six) Months.   Yes Alis Richard MD   dilTIAZem (TIAZAC) 240 MG 24 hr capsule Take 240 mg by mouth Daily. 9/21/21  Yes Alis Richard MD   isosorbide mononitrate (IMDUR) 30 MG 24 hr tablet Take 30 mg by mouth 2 (Two) Times a Day. 1/18/21  Yes Alis Richard MD   linaclotide (LINZESS) 290 MCG capsule capsule Take 1 capsule by mouth Every Morning Before Breakfast. 3/8/22  Yes Diane Haynes MD   nitroglycerin (NITROSTAT) 0.4 MG SL tablet PLACE 1 TABLET UNDER TONGUE EVERY FIVE MINUTES (MAX. OF 3) IF NO RELIEF SEEK ER 12/31/21  Yes Alis Richard MD   omeprazole (priLOSEC) 40 MG capsule Take 1 capsule by mouth Daily. 3/8/22  Yes Diane Haynes MD   Breo Ellipta 100-25 MCG/INH inhaler Inhale 1 puff Daily. 4/4/22   Alis Richard MD   clarithromycin (BIAXIN) 500 MG tablet Take 1 tablet by mouth 2 (Two) Times a Day. 4/29/22   Diane Haynes MD   lansoprazole (PREVACID) 30 MG capsule Take 1 capsule by mouth 2 (Two) Times a Day for 14 days. 4/29/22 5/13/22  Diane Haynes MD   metroNIDAZOLE (FLAGYL) 500 MG tablet Take 1 tablet by mouth 3 (Three) Times a Day for 14 days. 4/29/22 5/13/22  Diane Haynes MD   ProAir  (90 Base) MCG/ACT inhaler Inhale 1 puff Every 4 (Four) Hours As Needed for Wheezing or Shortness of Air. 4/4/22   Alis Richard MD     Allergies   Allergen Reactions   • Doxycycline Swelling     Face and hands swell   • Penicillins Swelling     Facial and throat swelling     Social History     Socioeconomic History   • Marital status: Single   Tobacco Use   • Smoking status: Current Every Day Smoker     Packs/day: 2.00     Years: 42.00     Pack years: 84.00     Types: Cigarettes   • Smokeless tobacco: Never Used   • Tobacco comment: Working  "with program in [place] to stop smoking   Vaping Use   • Vaping Use: Former   • Substances: Nicotine   • Devices: 03/08/2022 - Patient states device was refillable.   Substance and Sexual Activity   • Alcohol use: Yes     Comment: Patient confirmed she consumes 2 Beer per night to aid in sleep and drinks to celebrate birthdays.   • Drug use: Never   • Sexual activity: Defer       Review of Systems  Review of Systems   Constitutional: Negative for chills, fatigue, fever and unexpected weight change.   HENT: Negative for congestion, ear discharge, hearing loss, nosebleeds and sore throat.    Eyes: Negative for pain, discharge and redness.   Respiratory: Negative for cough, chest tightness, shortness of breath and wheezing.    Cardiovascular: Negative for chest pain and palpitations.   Gastrointestinal: Positive for abdominal pain. Negative for abdominal distention, blood in stool, constipation, diarrhea, nausea and vomiting.   Endocrine: Negative for cold intolerance, polydipsia, polyphagia and polyuria.   Genitourinary: Negative for dysuria, flank pain, frequency, hematuria and urgency.   Musculoskeletal: Negative for arthralgias, back pain, joint swelling and myalgias.   Skin: Negative for color change, pallor and rash.   Neurological: Negative for tremors, seizures, syncope, weakness and headaches.   Hematological: Negative for adenopathy. Does not bruise/bleed easily.   Psychiatric/Behavioral: Negative for behavioral problems, confusion, dysphoric mood, hallucinations and suicidal ideas. The patient is not nervous/anxious.         /81 (BP Location: Right arm)   Pulse 89   Ht 154.9 cm (61\")   Wt 38.2 kg (84 lb 3.2 oz)   BMI 15.91 kg/m²     Objective    Physical Exam telephone visit  Lab on 02/01/2022   Component Date Value Ref Range Status   • Glucose 02/01/2022 130 (A) 65 - 99 mg/dL Final   • BUN 02/01/2022 8  6 - 20 mg/dL Final   • Creatinine 02/01/2022 0.66  0.57 - 1.00 mg/dL Final   • Sodium " 02/01/2022 141  136 - 145 mmol/L Final   • Potassium 02/01/2022 4.0  3.5 - 5.2 mmol/L Final   • Chloride 02/01/2022 104  98 - 107 mmol/L Final   • CO2 02/01/2022 26.0  22.0 - 29.0 mmol/L Final   • Calcium 02/01/2022 9.5  8.6 - 10.5 mg/dL Final   • Total Protein 02/01/2022 6.8  6.0 - 8.5 g/dL Final   • Albumin 02/01/2022 4.00  3.50 - 5.20 g/dL Final   • ALT (SGPT) 02/01/2022 22  1 - 33 U/L Final   • AST (SGOT) 02/01/2022 36 (A) 1 - 32 U/L Final   • Alkaline Phosphatase 02/01/2022 144 (A) 39 - 117 U/L Final   • Total Bilirubin 02/01/2022 0.4  0.0 - 1.2 mg/dL Final   • eGFR Non African Amer 02/01/2022 93  >60 mL/min/1.73 Final   • Globulin 02/01/2022 2.8  gm/dL Final   • A/G Ratio 02/01/2022 1.4  g/dL Final   • BUN/Creatinine Ratio 02/01/2022 12.1  7.0 - 25.0 Final   • Anion Gap 02/01/2022 11.0  5.0 - 15.0 mmol/L Final   • Magnesium 02/01/2022 1.7  1.6 - 2.6 mg/dL Final   • Phosphorus 02/01/2022 5.1 (A) 2.5 - 4.5 mg/dL Final     Assessment/Plan      1. Mcneil's esophagus without dysplasia    1.  Abdominal pain with constipation, well controlled.  Likely due to irritable bowel syndrome.    Continue MiraLAX 17 g p.o. daily, high-fiber diet and Linzess 290 mcg p.o. daily.  Repeat colonoscopy in 5 years.  2.  Abdominal pain with weight loss, nausea and vomiting, improving.  Continue Prilosec 40 mg p.o. daily.    Gastric emptying scan if symptoms recur.  3.  Family history of colon cancer, patient needs high risk screening for colorectal neoplasia.    Repeat colonoscopy in 5 years.  4.    Mcneil's esophagus, continue PPI.  Antireflux lifestyle. repeat EGD for surveillance.  5.  Weight loss, stabilized.  Continue p.o. nutritional supplements.  Patient counseled.  6.  Tobacco usage, recommend cessation.       Orders placed during this encounter include:  Orders Placed This Encounter   Procedures   • COVID PRE-OP / PRE-PROCEDURE SCREENING ORDER (NO ISOLATION) - Swab, Nasopharynx     Standing Status:   Future     Number of  Occurrences:   1     Standing Expiration Date:   4/8/2023     Order Specific Question:   Release to patient     Answer:   Immediate     Order Specific Question:   Please select your location:     Answer:   [facility]     Order Specific Question:   COVID Screening Order:     Answer:   In-House: PRE-OP: BD MAX, 8-10 HR TAT [[id number]]     Order Specific Question:   Previously tested for COVID-19?     Answer:   Yes     Order Specific Question:   Employed in healthcare setting?     Answer:   No     Order Specific Question:   Symptomatic for COVID-19 as defined by CDC?     Answer:   No     Order Specific Question:   Hospitalized for COVID-19?     Answer:   No     Order Specific Question:   Admitted to ICU for COVID-19?     Answer:   No     Order Specific Question:   Resident in a congregate (group) care setting?     Answer:   No     Order Specific Question:   Pregnant?     Answer:   No   • Follow Anesthesia Guidelines / Standing Orders     Standing Status:   Future   • Obtain Informed Consent     Standing Status:   Future     Order Specific Question:   Informed Consent Given For     Answer:   ESOPHAGOGASTRODUODENOSCOPY       ESOPHAGOGASTRODUODENOSCOPY (N/A)    Review and/or summary of lab tests, radiology, procedures, medications. Review and summary of old records and obtaining of history. The risks and benefits of my recommendations, as well as other treatment options were discussed with the patient today. Questions were answered.    No orders of the defined types were placed in this encounter.      Follow-up: Return in about 1 month (around 5/8/2022).               Results for orders placed or performed in visit on 05/02/22   Phosphorus    Specimen: Blood   Result Value Ref Range    Phosphorus 3.6 2.5 - 4.5 mg/dL   Magnesium    Specimen: Blood   Result Value Ref Range    Magnesium 1.5 (L) 1.6 - 2.6 mg/dL   Comprehensive metabolic panel    Specimen: Blood   Result Value Ref Range    Glucose 117 (H) 65 - 99 mg/dL     BUN 8 6 - 20 mg/dL    Creatinine 0.47 (L) 0.57 - 1.00 mg/dL    Sodium 142 136 - 145 mmol/L    Potassium 3.4 (L) 3.5 - 5.2 mmol/L    Chloride 111 (H) 98 - 107 mmol/L    CO2 21.0 (L) 22.0 - 29.0 mmol/L    Calcium 8.8 8.6 - 10.5 mg/dL    Total Protein 6.5 6.0 - 8.5 g/dL    Albumin 4.00 3.50 - 5.20 g/dL    ALT (SGPT) 49 (H) 1 - 33 U/L    AST (SGOT) 80 (H) 1 - 32 U/L    Alkaline Phosphatase 96 39 - 117 U/L    Total Bilirubin 0.2 0.0 - 1.2 mg/dL    Globulin 2.5 gm/dL    A/G Ratio 1.6 g/dL    BUN/Creatinine Ratio 17.0 7.0 - 25.0    Anion Gap 10.0 5.0 - 15.0 mmol/L    eGFR 111.2 >60.0 mL/min/1.73   Results for orders placed or performed during the hospital encounter of 04/22/22   Tissue Pathology Exam    Specimen: A: Gastric, Antrum; Tissue    B: Esophagus; Tissue   Result Value Ref Range    Case Report       Surgical Pathology Report                         Case: [id number]                                  Authorizing Provider:  Diane Haynes MD      Collected:           04/22/2022 01:02 PM          Ordering Location:     [facility]   Received:            04/25/2022 07:59 AM                                 [facility]                                                     Pathologist:           Kenji Guzmán MD                                                             Specimens:   1) - Gastric, Antrum, KG                                                                            2) - Esophagus, EGJ         KG                                                             Final Diagnosis       See Scanned Report       Results for orders placed or performed in visit on 04/19/22   COVID-19, [facility] IN-HOUSE, NP SWAB IN TRANSPORT MEDIA 8-10 HR TAT - Swab, Nasopharynx    Specimen: Nasopharynx; Swab   Result Value Ref Range    COVID19 Not Detected Not Detected - Ref. Range   Results for orders placed or performed in visit on 02/01/22   Phosphorus    Specimen: Blood   Result Value Ref Range     Phosphorus 5.1 (H) 2.5 - 4.5 mg/dL   Magnesium    Specimen: Blood   Result Value Ref Range    Magnesium 1.7 1.6 - 2.6 mg/dL   Comprehensive Metabolic Panel    Specimen: Blood   Result Value Ref Range    Glucose 130 (H) 65 - 99 mg/dL    BUN 8 6 - 20 mg/dL    Creatinine 0.66 0.57 - 1.00 mg/dL    Sodium 141 136 - 145 mmol/L    Potassium 4.0 3.5 - 5.2 mmol/L    Chloride 104 98 - 107 mmol/L    CO2 26.0 22.0 - 29.0 mmol/L    Calcium 9.5 8.6 - 10.5 mg/dL    Total Protein 6.8 6.0 - 8.5 g/dL    Albumin 4.00 3.50 - 5.20 g/dL    ALT (SGPT) 22 1 - 33 U/L    AST (SGOT) 36 (H) 1 - 32 U/L    Alkaline Phosphatase 144 (H) 39 - 117 U/L    Total Bilirubin 0.4 0.0 - 1.2 mg/dL    eGFR Non African Amer 93 >60 mL/min/1.73    Globulin 2.8 gm/dL    A/G Ratio 1.4 g/dL    BUN/Creatinine Ratio 12.1 7.0 - 25.0    Anion Gap 11.0 5.0 - 15.0 mmol/L   Results for orders placed or performed in visit on 12/16/21   Phosphorus    Specimen: Blood   Result Value Ref Range    Phosphorus 2.9 2.5 - 4.5 mg/dL   Magnesium    Specimen: Blood   Result Value Ref Range    Magnesium 1.7 1.6 - 2.6 mg/dL   Comprehensive metabolic panel    Specimen: Blood   Result Value Ref Range    Glucose 105 (H) 65 - 99 mg/dL    BUN 8 6 - 20 mg/dL    Creatinine 0.51 (L) 0.57 - 1.00 mg/dL    Sodium 139 136 - 145 mmol/L    Potassium 3.6 3.5 - 5.2 mmol/L    Chloride 103 98 - 107 mmol/L    CO2 28.0 22.0 - 29.0 mmol/L    Calcium 8.7 8.6 - 10.5 mg/dL    Total Protein 6.5 6.0 - 8.5 g/dL    Albumin 4.20 3.50 - 5.20 g/dL    ALT (SGPT) 18 1 - 33 U/L    AST (SGOT) 28 1 - 32 U/L    Alkaline Phosphatase 122 (H) 39 - 117 U/L    Total Bilirubin 0.4 0.0 - 1.2 mg/dL    eGFR Non African Amer 125 >60 mL/min/1.73    Globulin 2.3 gm/dL    A/G Ratio 1.8 g/dL    BUN/Creatinine Ratio 15.7 7.0 - 25.0    Anion Gap 8.0 5.0 - 15.0 mmol/L   Results for orders placed or performed in visit on 06/14/21   Vitamin D 25 Hydroxy    Specimen: Blood   Result Value Ref Range    25 Hydroxy, Vitamin D 54.1 ng/ml    Results for orders placed or performed in visit on 02/25/21   BUN    Specimen: Blood   Result Value Ref Range    BUN 6 6 - 20 mg/dL   Creatinine, Serum    Specimen: Blood   Result Value Ref Range    Creatinine 0.40 (L) 0.57 - 1.00 mg/dL    eGFR Non African Amer >150 >60 mL/min/1.73   Results for orders placed or performed during the hospital encounter of 02/09/21   Tissue Pathology Exam    Specimen: A: Small Intestine, Duodenum; Tissue    B: Gastric, Antrum; Tissue    C: Esophagus; Tissue   Result Value Ref Range    Case Report       Surgical Pathology Report                         Case: [id number]                                  Authorizing Provider:  Diane Haynes MD      Collected:           02/09/2021 02:22 PM          Ordering Location:     [facility]             Received:            02/10/2021 06:42 AM                                 [facility]                                                     Pathologist:           Farhat Velasco MD                                                          Specimens:   1) - Small Intestine, Duodenum, duodenum bx                                                         2) - Gastric, Antrum, antrum bx                                                                     3) - Esophagus, eg junction bx                                                             Final Diagnosis       SEE SCANNED REPORT       Results for orders placed or performed in visit on 02/06/21   COVID-19,[facility] IN-HOUSE, NP/OP SWAB IN UTM/VTM/SALINE TRANSPORT MEDIA,24 HR TAT - Swab, Nasopharynx    Specimen: Nasopharynx; Swab   Result Value Ref Range    COVID19 Not Detected Not Detected - Ref. Range   Results for orders placed or performed in visit on 12/14/20   CBC Auto Differential    Specimen: Arm, Right; Blood   Result Value Ref Range    WBC 8.55 3.40 - 10.80 10*3/mm3    RBC 4.11 3.77 - 5.28 10*6/mm3    Hemoglobin 13.5 12.0 - 15.9 g/dL    Hematocrit 39.7 34.0 - 46.6  %    MCV 96.6 79.0 - 97.0 fL    MCH 32.8 26.6 - 33.0 pg    MCHC 34.0 31.5 - 35.7 g/dL    RDW 12.1 (L) 12.3 - 15.4 %    RDW-SD 42.6 37.0 - 54.0 fl    MPV 9.5 6.0 - 12.0 fL    Platelets 216 140 - 450 10*3/mm3    Neutrophil % 58.0 42.7 - 76.0 %    Lymphocyte % 31.7 19.6 - 45.3 %    Monocyte % 8.0 5.0 - 12.0 %    Eosinophil % 1.3 0.3 - 6.2 %    Basophil % 0.6 0.0 - 1.5 %    Immature Grans % 0.4 0.0 - 0.5 %    Neutrophils, Absolute 4.97 1.70 - 7.00 10*3/mm3    Lymphocytes, Absolute 2.71 0.70 - 3.10 10*3/mm3    Monocytes, Absolute 0.68 0.10 - 0.90 10*3/mm3    Eosinophils, Absolute 0.11 0.00 - 0.40 10*3/mm3    Basophils, Absolute 0.05 0.00 - 0.20 10*3/mm3    Immature Grans, Absolute 0.03 0.00 - 0.05 10*3/mm3    nRBC 0.0 0.0 - 0.2 /100 WBC   Phosphorus    Specimen: Arm, Right; Blood   Result Value Ref Range    Phosphorus 3.6 2.5 - 4.5 mg/dL   Magnesium    Specimen: Arm, Right; Blood   Result Value Ref Range    Magnesium 1.5 (L) 1.6 - 2.6 mg/dL   Comprehensive Metabolic Panel    Specimen: Arm, Right; Blood   Result Value Ref Range    Glucose 124 (H) 65 - 99 mg/dL    BUN 8 6 - 20 mg/dL    Creatinine 0.48 (L) 0.57 - 1.00 mg/dL    Sodium 140 136 - 145 mmol/L    Potassium 3.7 3.5 - 5.2 mmol/L    Chloride 106 98 - 107 mmol/L    CO2 24.0 22.0 - 29.0 mmol/L    Calcium 9.1 8.6 - 10.5 mg/dL    Total Protein 6.8 6.0 - 8.5 g/dL    Albumin 4.00 3.50 - 5.20 g/dL    ALT (SGPT) 24 1 - 33 U/L    AST (SGOT) 28 1 - 32 U/L    Alkaline Phosphatase 123 (H) 39 - 117 U/L    Total Bilirubin 0.5 0.0 - 1.2 mg/dL    eGFR Non African Amer 134 >60 mL/min/1.73    Globulin 2.8 gm/dL    A/G Ratio 1.4 g/dL    BUN/Creatinine Ratio 16.7 7.0 - 25.0    Anion Gap 10.0 5.0 - 15.0 mmol/L     *Note: Due to a large number of results and/or encounters for the requested time period, some results have not been displayed. A complete set of results can be found in Results Review.         This document has been electronically signed by Diane Haynes MD on May 8, 2022  13:40 CDT

## 2022-05-12 RX ORDER — ONDANSETRON 8 MG/1
8 TABLET, ORALLY DISINTEGRATING ORAL 2 TIMES DAILY
Qty: 28 TABLET | Refills: 0 | Status: SHIPPED | OUTPATIENT
Start: 2022-05-12 | End: 2022-05-26

## 2022-06-03 ENCOUNTER — HOSPITAL ENCOUNTER (OUTPATIENT)
Dept: GENERAL RADIOLOGY | Facility: HOSPITAL | Age: 57
Discharge: HOME | End: 2022-06-03
Admitting: NURSE PRACTITIONER

## 2022-06-03 ENCOUNTER — OFFICE VISIT (OUTPATIENT)
Dept: GASTROENTEROLOGY | Facility: CLINIC | Age: 57
End: 2022-06-03

## 2022-06-03 VITALS
BODY MASS INDEX: 15.05 KG/M2 | HEART RATE: 94 BPM | DIASTOLIC BLOOD PRESSURE: 72 MMHG | SYSTOLIC BLOOD PRESSURE: 129 MMHG | HEIGHT: 62 IN | WEIGHT: 81.8 LBS

## 2022-06-03 DIAGNOSIS — K21.00 GASTROESOPHAGEAL REFLUX DISEASE WITH ESOPHAGITIS WITHOUT HEMORRHAGE: Primary | ICD-10-CM

## 2022-06-03 DIAGNOSIS — R05.1 ACUTE COUGH: ICD-10-CM

## 2022-06-03 DIAGNOSIS — R06.02 SHORTNESS OF BREATH: ICD-10-CM

## 2022-06-03 PROCEDURE — 71046 X-RAY EXAM CHEST 2 VIEWS: CPT

## 2022-06-03 PROCEDURE — 99213 OFFICE O/P EST LOW 20 MIN: CPT | Performed by: INTERNAL MEDICINE

## 2022-06-03 RX ORDER — FAMOTIDINE 40 MG/1
40 TABLET, FILM COATED ORAL DAILY
Qty: 30 TABLET | Refills: 6 | Status: SHIPPED | OUTPATIENT
Start: 2022-06-03 | End: 2022-07-03

## 2022-06-20 ENCOUNTER — APPOINTMENT (OUTPATIENT)
Dept: LAB | Facility: HOSPITAL | Age: 57
End: 2022-06-20

## 2022-06-27 ENCOUNTER — APPOINTMENT (OUTPATIENT)
Dept: ONCOLOGY | Facility: HOSPITAL | Age: 57
End: 2022-06-27

## 2022-06-27 ENCOUNTER — APPOINTMENT (OUTPATIENT)
Dept: GENERAL RADIOLOGY | Facility: HOSPITAL | Age: 57
End: 2022-06-27

## 2022-06-27 ENCOUNTER — HOSPITAL ENCOUNTER (EMERGENCY)
Facility: HOSPITAL | Age: 57
Discharge: HOME | End: 2022-06-27
Attending: STUDENT IN AN ORGANIZED HEALTH CARE EDUCATION/TRAINING PROGRAM | Admitting: STUDENT IN AN ORGANIZED HEALTH CARE EDUCATION/TRAINING PROGRAM

## 2022-06-27 VITALS
TEMPERATURE: 98 F | SYSTOLIC BLOOD PRESSURE: 167 MMHG | OXYGEN SATURATION: 96 % | BODY MASS INDEX: 15.09 KG/M2 | DIASTOLIC BLOOD PRESSURE: 87 MMHG | HEART RATE: 86 BPM | WEIGHT: 82 LBS | RESPIRATION RATE: 18 BRPM | HEIGHT: 62 IN

## 2022-06-27 DIAGNOSIS — S90.02XA CONTUSION OF LEFT ANKLE, INITIAL ENCOUNTER: ICD-10-CM

## 2022-06-27 DIAGNOSIS — S80.02XA CONTUSION OF LEFT KNEE, INITIAL ENCOUNTER: Primary | ICD-10-CM

## 2022-06-27 PROCEDURE — 73610 X-RAY EXAM OF ANKLE: CPT

## 2022-06-27 PROCEDURE — 99283 EMERGENCY DEPT VISIT LOW MDM: CPT

## 2022-06-27 PROCEDURE — 73564 X-RAY EXAM KNEE 4 OR MORE: CPT

## 2022-06-27 RX ORDER — NAPROXEN 500 MG/1
500 TABLET ORAL 2 TIMES DAILY PRN
Qty: 10 TABLET | Refills: 0 | Status: SHIPPED | OUTPATIENT
Start: 2022-06-27 | End: 2022-06-27 | Stop reason: SDUPTHER

## 2022-06-27 RX ORDER — HYDROCODONE BITARTRATE AND ACETAMINOPHEN 5; 325 MG/1; MG/1
1 TABLET ORAL ONCE
Status: COMPLETED | OUTPATIENT
Start: 2022-06-27 | End: 2022-06-27

## 2022-06-27 RX ORDER — NAPROXEN 500 MG/1
500 TABLET ORAL 2 TIMES DAILY PRN
Qty: 10 TABLET | Refills: 0 | Status: SHIPPED | OUTPATIENT
Start: 2022-06-27

## 2022-06-27 RX ADMIN — HYDROCODONE BITARTRATE AND ACETAMINOPHEN 1 TABLET: 5; 325 TABLET ORAL at 17:18

## 2022-06-27 NOTE — PROGRESS NOTES
Chief Complaint   Patient presents with   • Constipation       Subjective    Muna Zayas is a 57 y.o. female.    History of Present Illness  Patient presented to GI clinic for follow-up visit today.  Has intermittent symptoms of chest pain and cough.  Also has back pain.  Denies abdominal pain, nausea or vomiting.  Bowel movements are regular.  Weight is stable.  Taking Prilosec daily.       The following portions of the patient's history were reviewed and updated as appropriate:   Past Medical History:   Diagnosis Date   • COPD (chronic obstructive pulmonary disease) (HCC)    • Coronary artery disease    • Hypertension      Past Surgical History:   Procedure Laterality Date   • COLONOSCOPY N/A 2021    Procedure: COLONOSCOPY;  Surgeon: Diane Haynes MD;  Location: [facility] ENDOSCOPY;  Service: Gastroenterology;  Laterality: N/A;   • CORONARY ANGIOPLASTY WITH STENT PLACEMENT     • ENDOSCOPY N/A 2021    Procedure: ESOPHAGOGASTRODUODENOSCOPY;  Surgeon: Diane Haynes MD;  Location: [facility] ENDOSCOPY;  Service: Gastroenterology;  Laterality: N/A;   • ENDOSCOPY  2022   • ENDOSCOPY N/A 2022    Procedure: ESOPHAGOGASTRODUODENOSCOPY;  Surgeon: Diane Haynes MD;  Location: [facility] ENDOSCOPY;  Service: Gastroenterology;  Laterality: N/A;   • NASAL SEPTUM SURGERY     • UPPER GASTROINTESTINAL ENDOSCOPY  2021     Family History   Problem Relation Age of Onset   • Colon cancer Paternal Grandmother      OB History        3    Para   3    Term   3            AB        Living           SAB        IAB        Ectopic        Molar        Multiple        Live Births                  Prior to Admission medications    Medication Sig Start Date End Date Taking? Authorizing Provider   aspirin 81 MG chewable tablet Chew 81 mg Daily.   Yes ProviderAlis MD   atorvastatin (LIPITOR) 10 MG tablet Take 10 mg by mouth Daily. 21  Yes ProviderAlis MD Breo Ellipta  100-25 MCG/INH inhaler Inhale 1 puff Daily. 4/4/22  Yes Alis Richard MD   CALCIUM-VITAMIN D PO Take 1 tablet by mouth Daily.   Yes Alis Richard MD   denosumab (Prolia) 60 MG/ML solution prefilled syringe syringe Inject 60 mg under the skin into the appropriate area as directed Every 6 (Six) Months.   Yes Alis Richard MD   dilTIAZem (TIAZAC) 240 MG 24 hr capsule Take 240 mg by mouth Daily. 9/21/21  Yes Alis Richard MD   isosorbide mononitrate (IMDUR) 30 MG 24 hr tablet Take 30 mg by mouth 2 (Two) Times a Day. 1/18/21  Yes Alis Richard MD   linaclotide (LINZESS) 290 MCG capsule capsule Take 1 capsule by mouth Every Morning Before Breakfast. 3/8/22  Yes Diane Haynes MD   nitroglycerin (NITROSTAT) 0.4 MG SL tablet PLACE 1 TABLET UNDER TONGUE EVERY FIVE MINUTES (MAX. OF 3) IF NO RELIEF SEEK ER 12/31/21  Yes Alis Richard MD   omeprazole (priLOSEC) 40 MG capsule Take 1 capsule by mouth Daily. 3/8/22  Yes Diane Haynes MD   ProAir  (90 Base) MCG/ACT inhaler Inhale 1 puff Every 4 (Four) Hours As Needed for Wheezing or Shortness of Air. 4/4/22  Yes Alis Richard MD   famotidine (Pepcid) 40 MG tablet Take 1 tablet by mouth Daily for 30 days. 6/3/22 7/3/22  Diane Haynes MD     Allergies   Allergen Reactions   • Doxycycline Swelling     Face and hands swell   • Penicillins Swelling     Facial and throat swelling     Social History     Socioeconomic History   • Marital status:    Tobacco Use   • Smoking status: Current Every Day Smoker     Packs/day: 1.00     Years: 42.00     Pack years: 42.00     Types: Cigarettes   • Smokeless tobacco: Never Used   • Tobacco comment: Working with program in [place] to stop smoking   Vaping Use   • Vaping Use: Former   • Substances: Nicotine   • Devices: 03/08/2022 - Patient states device was refillable.   Substance and Sexual Activity   • Alcohol use: Not Currently     Comment: Patient confirmed she  "consumes 2 Beer per night to aid in sleep and drinks to celebrate birthdays.   • Drug use: Never   • Sexual activity: Defer       Review of Systems  Review of Systems   Constitutional: Negative for chills, fatigue, fever and unexpected weight change.   HENT: Negative for congestion, ear discharge, hearing loss, nosebleeds and sore throat.    Eyes: Negative for pain, discharge and redness.   Respiratory: Positive for cough. Negative for chest tightness, shortness of breath and wheezing.    Cardiovascular: Positive for chest pain. Negative for palpitations.   Gastrointestinal: Negative for abdominal distention, abdominal pain, blood in stool, constipation, diarrhea, nausea and vomiting.   Endocrine: Negative for cold intolerance, polydipsia, polyphagia and polyuria.   Genitourinary: Negative for dysuria, flank pain, frequency, hematuria and urgency.   Musculoskeletal: Negative for arthralgias, back pain, joint swelling and myalgias.   Skin: Negative for color change, pallor and rash.   Neurological: Negative for tremors, seizures, syncope, weakness and headaches.   Hematological: Negative for adenopathy. Does not bruise/bleed easily.   Psychiatric/Behavioral: Negative for behavioral problems, confusion, dysphoric mood, hallucinations and suicidal ideas. The patient is not nervous/anxious.         /72   Pulse 94   Ht 157.5 cm (62\")   Wt 37.1 kg (81 lb 12.8 oz)   BMI 14.96 kg/m²     Objective    Physical Exam  Constitutional:       Appearance: She is well-developed.   HENT:      Head: Normocephalic and atraumatic.   Eyes:      Conjunctiva/sclera: Conjunctivae normal.      Pupils: Pupils are equal, round, and reactive to light.   Neck:      Thyroid: No thyromegaly.   Cardiovascular:      Rate and Rhythm: Normal rate and regular rhythm.      Heart sounds: Normal heart sounds. No murmur heard.  Pulmonary:      Effort: Pulmonary effort is normal.      Breath sounds: Normal breath sounds. No wheezing.   Abdominal: "      General: Bowel sounds are normal. There is no distension.      Palpations: Abdomen is soft. There is no mass.      Tenderness: There is no abdominal tenderness.      Hernia: No hernia is present.   Genitourinary:     Comments: No lesions noted  Musculoskeletal:         General: No tenderness. Normal range of motion.      Cervical back: Normal range of motion and neck supple.   Lymphadenopathy:      Cervical: No cervical adenopathy.   Skin:     General: Skin is warm and dry.      Findings: No rash.   Neurological:      Mental Status: She is alert and oriented to person, place, and time.      Cranial Nerves: No cranial nerve deficit.   Psychiatric:         Thought Content: Thought content normal.       Lab on 05/02/2022   Component Date Value Ref Range Status   • Glucose 05/02/2022 117 (A) 65 - 99 mg/dL Final   • BUN 05/02/2022 8  6 - 20 mg/dL Final   • Creatinine 05/02/2022 0.47 (A) 0.57 - 1.00 mg/dL Final   • Sodium 05/02/2022 142  136 - 145 mmol/L Final   • Potassium 05/02/2022 3.4 (A) 3.5 - 5.2 mmol/L Final   • Chloride 05/02/2022 111 (A) 98 - 107 mmol/L Final   • CO2 05/02/2022 21.0 (A) 22.0 - 29.0 mmol/L Final   • Calcium 05/02/2022 8.8  8.6 - 10.5 mg/dL Final   • Total Protein 05/02/2022 6.5  6.0 - 8.5 g/dL Final   • Albumin 05/02/2022 4.00  3.50 - 5.20 g/dL Final   • ALT (SGPT) 05/02/2022 49 (A) 1 - 33 U/L Final   • AST (SGOT) 05/02/2022 80 (A) 1 - 32 U/L Final   • Alkaline Phosphatase 05/02/2022 96  39 - 117 U/L Final   • Total Bilirubin 05/02/2022 0.2  0.0 - 1.2 mg/dL Final   • Globulin 05/02/2022 2.5  gm/dL Final   • A/G Ratio 05/02/2022 1.6  g/dL Final   • BUN/Creatinine Ratio 05/02/2022 17.0  7.0 - 25.0 Final   • Anion Gap 05/02/2022 10.0  5.0 - 15.0 mmol/L Final   • eGFR 05/02/2022 111.2  >60.0 mL/min/1.73 Final    National Kidney Foundation and American Society of Nephrology (ASN) Task Force recommended calculation based on the Chronic Kidney Disease Epidemiology Collaboration (CKD-EPI) equation  refit without adjustment for race.   • Magnesium 05/02/2022 1.5 (A) 1.6 - 2.6 mg/dL Final   • Phosphorus 05/02/2022 3.6  2.5 - 4.5 mg/dL Final     Assessment & Plan    No diagnosis found..   1.  Abdominal pain with constipation, well controlled.  Likely due to irritable bowel syndrome.    Continue MiraLAX 17 g p.o. daily, high-fiber diet and Linzess 290 mcg p.o. daily.  Repeat colonoscopy in 5 years.  2.  Abdominal pain with weight loss, nausea and vomiting, improving.  Continue Prilosec 40 mg p.o. daily.    Gastric emptying scan if symptoms recur.  3.  Family history of colon cancer, patient needs high risk screening for colorectal neoplasia.    Repeat colonoscopy in 5 years.  4.    Mcneil's esophagus, continue PPI.  Antireflux lifestyle. repeat EGD  in 3 years for surveillance.  5.  Weight loss, stabilized.  Continue p.o. nutritional supplements.  Patient counseled.  6.  Cough and chest pain, likely due to GERD.  Could also be due to pulmonary pathology.  Continue Prilosec daily.  Add Pepcid nightly.  CT chest if symptoms continue.  7.  Tobacco usage, recommend cessation.     Orders placed during this encounter include:  No orders of the defined types were placed in this encounter.      * Surgery not found *    Review and/or summary of lab tests, radiology, procedures, medications. Review and summary of old records and obtaining of history. The risks and benefits of my recommendations, as well as other treatment options were discussed with the patient today. Questions were answered.    New Medications Ordered This Visit   Medications   • famotidine (Pepcid) 40 MG tablet     Sig: Take 1 tablet by mouth Daily for 30 days.     Dispense:  30 tablet     Refill:  6       Follow-up: Return in about 1 month (around 7/3/2022).               Results for orders placed or performed in visit on 05/02/22   Phosphorus    Specimen: Blood   Result Value Ref Range    Phosphorus 3.6 2.5 - 4.5 mg/dL   Magnesium    Specimen: Blood    Result Value Ref Range    Magnesium 1.5 (L) 1.6 - 2.6 mg/dL   Comprehensive metabolic panel    Specimen: Blood   Result Value Ref Range    Glucose 117 (H) 65 - 99 mg/dL    BUN 8 6 - 20 mg/dL    Creatinine 0.47 (L) 0.57 - 1.00 mg/dL    Sodium 142 136 - 145 mmol/L    Potassium 3.4 (L) 3.5 - 5.2 mmol/L    Chloride 111 (H) 98 - 107 mmol/L    CO2 21.0 (L) 22.0 - 29.0 mmol/L    Calcium 8.8 8.6 - 10.5 mg/dL    Total Protein 6.5 6.0 - 8.5 g/dL    Albumin 4.00 3.50 - 5.20 g/dL    ALT (SGPT) 49 (H) 1 - 33 U/L    AST (SGOT) 80 (H) 1 - 32 U/L    Alkaline Phosphatase 96 39 - 117 U/L    Total Bilirubin 0.2 0.0 - 1.2 mg/dL    Globulin 2.5 gm/dL    A/G Ratio 1.6 g/dL    BUN/Creatinine Ratio 17.0 7.0 - 25.0    Anion Gap 10.0 5.0 - 15.0 mmol/L    eGFR 111.2 >60.0 mL/min/1.73   Results for orders placed or performed during the hospital encounter of 04/22/22   Tissue Pathology Exam    Specimen: A: Gastric, Antrum; Tissue    B: Esophagus; Tissue   Result Value Ref Range    Case Report       Surgical Pathology Report                         Case: [id number]                                  Authorizing Provider:  Diane Haynes MD      Collected:           04/22/2022 01:02 PM          [facility]   Received:            04/25/2022 07:59 AM                                 [facility]                                                     Pathologist:           Kenji Guzmán MD                                                             Specimens:   1) - Gastric, Antrum, KG                                                                            2) - Esophagus, EGJ         KG                                                             Final Diagnosis       See Scanned Report       Results for orders placed or performed in visit on 04/19/22   COVID-19, [facility] IN-HOUSE, NP SWAB IN TRANSPORT MEDIA 8-10 HR TAT - Swab, Nasopharynx    Specimen: Nasopharynx; Swab   Result Value Ref Range     COVID19 Not Detected Not Detected - Ref. Range   Results for orders placed or performed in visit on 02/01/22   Phosphorus    Specimen: Blood   Result Value Ref Range    Phosphorus 5.1 (H) 2.5 - 4.5 mg/dL   Magnesium    Specimen: Blood   Result Value Ref Range    Magnesium 1.7 1.6 - 2.6 mg/dL   Comprehensive Metabolic Panel    Specimen: Blood   Result Value Ref Range    Glucose 130 (H) 65 - 99 mg/dL    BUN 8 6 - 20 mg/dL    Creatinine 0.66 0.57 - 1.00 mg/dL    Sodium 141 136 - 145 mmol/L    Potassium 4.0 3.5 - 5.2 mmol/L    Chloride 104 98 - 107 mmol/L    CO2 26.0 22.0 - 29.0 mmol/L    Calcium 9.5 8.6 - 10.5 mg/dL    Total Protein 6.8 6.0 - 8.5 g/dL    Albumin 4.00 3.50 - 5.20 g/dL    ALT (SGPT) 22 1 - 33 U/L    AST (SGOT) 36 (H) 1 - 32 U/L    Alkaline Phosphatase 144 (H) 39 - 117 U/L    Total Bilirubin 0.4 0.0 - 1.2 mg/dL    eGFR Non African Amer 93 >60 mL/min/1.73    Globulin 2.8 gm/dL    A/G Ratio 1.4 g/dL    BUN/Creatinine Ratio 12.1 7.0 - 25.0    Anion Gap 11.0 5.0 - 15.0 mmol/L   Results for orders placed or performed in visit on 12/16/21   Phosphorus    Specimen: Blood   Result Value Ref Range    Phosphorus 2.9 2.5 - 4.5 mg/dL   Magnesium    Specimen: Blood   Result Value Ref Range    Magnesium 1.7 1.6 - 2.6 mg/dL   Comprehensive metabolic panel    Specimen: Blood   Result Value Ref Range    Glucose 105 (H) 65 - 99 mg/dL    BUN 8 6 - 20 mg/dL    Creatinine 0.51 (L) 0.57 - 1.00 mg/dL    Sodium 139 136 - 145 mmol/L    Potassium 3.6 3.5 - 5.2 mmol/L    Chloride 103 98 - 107 mmol/L    CO2 28.0 22.0 - 29.0 mmol/L    Calcium 8.7 8.6 - 10.5 mg/dL    Total Protein 6.5 6.0 - 8.5 g/dL    Albumin 4.20 3.50 - 5.20 g/dL    ALT (SGPT) 18 1 - 33 U/L    AST (SGOT) 28 1 - 32 U/L    Alkaline Phosphatase 122 (H) 39 - 117 U/L    Total Bilirubin 0.4 0.0 - 1.2 mg/dL    eGFR Non African Amer 125 >60 mL/min/1.73    Globulin 2.3 gm/dL    A/G Ratio 1.8 g/dL    BUN/Creatinine Ratio 15.7 7.0 - 25.0    Anion Gap 8.0 5.0 - 15.0 mmol/L    Results for orders placed or performed in visit on 06/14/21   Vitamin D 25 Hydroxy    Specimen: Blood   Result Value Ref Range    25 Hydroxy, Vitamin D 54.1 ng/ml   Results for orders placed or performed in visit on 02/25/21   BUN    Specimen: Blood   Result Value Ref Range    BUN 6 6 - 20 mg/dL   Creatinine, Serum    Specimen: Blood   Result Value Ref Range    Creatinine 0.40 (L) 0.57 - 1.00 mg/dL    eGFR Non African Amer >150 >60 mL/min/1.73   Results for orders placed or performed during the hospital encounter of 02/09/21   Tissue Pathology Exam    Specimen: A: Small Intestine, Duodenum; Tissue    B: Gastric, Antrum; Tissue    C: Esophagus; Tissue   Result Value Ref Range    Case Report       Surgical Pathology Report                         Case: [id number]                                  Authorizing Provider:  Diane Haynes MD      Collected:           02/09/2021 02:22 PM          Ordering Location:     [facility]             Received:            02/10/2021 06:42 AM                                 [facility]                                                     Pathologist:           Farhat Velasco MD                                                          Specimens:   1) - Small Intestine, Duodenum, duodenum bx                                                         2) - Gastric, Antrum, antrum bx                                                                     3) - Esophagus, eg junction bx                                                             Final Diagnosis       SEE SCANNED REPORT       Results for orders placed or performed in visit on 02/06/21   COVID-19,APTIMA PANTHER,JESSIE IN-HOUSE, NP/OP SWAB IN UTM/VTM/SALINE TRANSPORT MEDIA,24 HR TAT - Swab, Nasopharynx    Specimen: Nasopharynx; Swab   Result Value Ref Range    COVID19 Not Detected Not Detected - Ref. Range   Results for orders placed or performed in visit on 12/14/20   CBC Auto Differential    Specimen: Arm, Right;  Blood   Result Value Ref Range    WBC 8.55 3.40 - 10.80 10*3/mm3    RBC 4.11 3.77 - 5.28 10*6/mm3    Hemoglobin 13.5 12.0 - 15.9 g/dL    Hematocrit 39.7 34.0 - 46.6 %    MCV 96.6 79.0 - 97.0 fL    MCH 32.8 26.6 - 33.0 pg    MCHC 34.0 31.5 - 35.7 g/dL    RDW 12.1 (L) 12.3 - 15.4 %    RDW-SD 42.6 37.0 - 54.0 fl    MPV 9.5 6.0 - 12.0 fL    Platelets 216 140 - 450 10*3/mm3    Neutrophil % 58.0 42.7 - 76.0 %    Lymphocyte % 31.7 19.6 - 45.3 %    Monocyte % 8.0 5.0 - 12.0 %    Eosinophil % 1.3 0.3 - 6.2 %    Basophil % 0.6 0.0 - 1.5 %    Immature Grans % 0.4 0.0 - 0.5 %    Neutrophils, Absolute 4.97 1.70 - 7.00 10*3/mm3    Lymphocytes, Absolute 2.71 0.70 - 3.10 10*3/mm3    Monocytes, Absolute 0.68 0.10 - 0.90 10*3/mm3    Eosinophils, Absolute 0.11 0.00 - 0.40 10*3/mm3    Basophils, Absolute 0.05 0.00 - 0.20 10*3/mm3    Immature Grans, Absolute 0.03 0.00 - 0.05 10*3/mm3    nRBC 0.0 0.0 - 0.2 /100 WBC   Phosphorus    Specimen: Arm, Right; Blood   Result Value Ref Range    Phosphorus 3.6 2.5 - 4.5 mg/dL   Magnesium    Specimen: Arm, Right; Blood   Result Value Ref Range    Magnesium 1.5 (L) 1.6 - 2.6 mg/dL   Comprehensive Metabolic Panel    Specimen: Arm, Right; Blood   Result Value Ref Range    Glucose 124 (H) 65 - 99 mg/dL    BUN 8 6 - 20 mg/dL    Creatinine 0.48 (L) 0.57 - 1.00 mg/dL    Sodium 140 136 - 145 mmol/L    Potassium 3.7 3.5 - 5.2 mmol/L    Chloride 106 98 - 107 mmol/L    CO2 24.0 22.0 - 29.0 mmol/L    Calcium 9.1 8.6 - 10.5 mg/dL    Total Protein 6.8 6.0 - 8.5 g/dL    Albumin 4.00 3.50 - 5.20 g/dL    ALT (SGPT) 24 1 - 33 U/L    AST (SGOT) 28 1 - 32 U/L    Alkaline Phosphatase 123 (H) 39 - 117 U/L    Total Bilirubin 0.5 0.0 - 1.2 mg/dL    eGFR Non African Amer 134 >60 mL/min/1.73    Globulin 2.8 gm/dL    A/G Ratio 1.4 g/dL    BUN/Creatinine Ratio 16.7 7.0 - 25.0    Anion Gap 10.0 5.0 - 15.0 mmol/L     *Note: Due to a large number of results and/or encounters for the requested time period, some results have not  been displayed. A complete set of results can be found in Results Review.         This document has been electronically signed by Diane Haynes MD on June 27, 2022 12:59 CDT

## 2022-07-07 ENCOUNTER — OFFICE VISIT (OUTPATIENT)
Dept: GASTROENTEROLOGY | Facility: CLINIC | Age: 57
End: 2022-07-07

## 2022-07-07 VITALS
HEIGHT: 62 IN | HEART RATE: 95 BPM | SYSTOLIC BLOOD PRESSURE: 130 MMHG | WEIGHT: 79.4 LBS | BODY MASS INDEX: 14.61 KG/M2 | DIASTOLIC BLOOD PRESSURE: 82 MMHG

## 2022-07-07 DIAGNOSIS — R63.4 WEIGHT LOSS: ICD-10-CM

## 2022-07-07 DIAGNOSIS — R10.84 GENERALIZED ABDOMINAL PAIN: Primary | ICD-10-CM

## 2022-07-07 PROCEDURE — 99214 OFFICE O/P EST MOD 30 MIN: CPT | Performed by: INTERNAL MEDICINE

## 2022-07-07 RX ORDER — FAMOTIDINE 40 MG/1
40 TABLET, FILM COATED ORAL DAILY
COMMUNITY
Start: 2022-07-05

## 2022-07-07 RX ORDER — MEGESTROL ACETATE 20 MG/1
20 TABLET ORAL DAILY
Qty: 30 TABLET | Refills: 5 | Status: SHIPPED | OUTPATIENT
Start: 2022-07-07 | End: 2022-08-06

## 2022-07-07 RX ORDER — DIPHENHYDRAMINE HYDROCHLORIDE 50 MG/1
CAPSULE, LIQUID FILLED ORAL
COMMUNITY
Start: 2022-06-03

## 2022-07-12 ENCOUNTER — TELEPHONE (OUTPATIENT)
Dept: GASTROENTEROLOGY | Facility: CLINIC | Age: 57
End: 2022-07-12

## 2022-07-12 RX ORDER — CYPROHEPTADINE HYDROCHLORIDE 4 MG/1
4 TABLET ORAL DAILY
Qty: 30 TABLET | Refills: 2 | Status: SHIPPED | OUTPATIENT
Start: 2022-07-12

## 2022-07-12 NOTE — TELEPHONE ENCOUNTER
----- Message from Angi Cabrera sent at 7/12/2022  9:20 AM CDT -----  Pt is saying that MEGACE is not covered by her ins and the Pharmacy told her there is something similar that ins would pay for we would just have to send it in. Pharmacy is [facility] and pt phone number is [phone].

## 2022-07-12 NOTE — TELEPHONE ENCOUNTER
07/12/2022, 1438 - Patient telephoned per this staff member [phone].  Zero answer.  Voice message submitted with date, time, office contact information, and request to contact office at earliest convenience regarding information concerning prescription medication.    Note - Alternate prescription medication, Periactin 4 MG Tablets, 1 tablet by mouth daily, #30, 2 renewals submittedd to [facility].

## 2022-07-14 NOTE — PROGRESS NOTES
Chief Complaint   Patient presents with   • Follow-up     1 Month follow-up       Subjective    Muna Zayas is a 57 y.o. female.    History of Present Illness   patient presented to GI clinic for follow-up visit today.  Feels some better currently.  Abdominal pain has improved.  Bowel movements are regular with Linzess.  Continues to lose weight.  Lost 2 pounds weight since last visit.  Appetite remains marginal.  BMI low at 14.52.       The following portions of the patient's history were reviewed and updated as appropriate:   Past Medical History:   Diagnosis Date   • COPD (chronic obstructive pulmonary disease) (HCC)    • Coronary artery disease    • Hypertension      Past Surgical History:   Procedure Laterality Date   • COLONOSCOPY N/A 2021    Procedure: COLONOSCOPY;  Surgeon: Diane Haynes MD;  Location: [facility] ENDOSCOPY;  Service: Gastroenterology;  Laterality: N/A;   • CORONARY ANGIOPLASTY WITH STENT PLACEMENT     • ENDOSCOPY N/A 2021    Procedure: ESOPHAGOGASTRODUODENOSCOPY;  Surgeon: Diane Haynes MD;  Location: [facility] ENDOSCOPY;  Service: Gastroenterology;  Laterality: N/A;   • ENDOSCOPY  2022   • ENDOSCOPY N/A 2022    Procedure: ESOPHAGOGASTRODUODENOSCOPY;  Surgeon: Diane Haynes MD;  Location: [facility] ENDOSCOPY;  Service: Gastroenterology;  Laterality: N/A;   • NASAL SEPTUM SURGERY     • UPPER GASTROINTESTINAL ENDOSCOPY  2021     Family History   Problem Relation Age of Onset   • Colon cancer Paternal Grandmother      OB History        3    Para   3    Term   3            AB        Living           SAB        IAB        Ectopic        Molar        Multiple        Live Births                  Prior to Admission medications    Medication Sig Start Date End Date Taking? Authorizing Provider   aspirin 81 MG chewable tablet Chew 81 mg Daily.   Yes Provider, MD Alis   atorvastatin (LIPITOR) 10 MG tablet Take 10 mg by mouth Daily. 21   Yes Alis Richard MD   Breelena Ellipta 100-25 MCG/INH inhaler Inhale 1 puff Daily. 4/4/22  Yes Alis Richard MD   CALCIUM-VITAMIN D PO Take 1 tablet by mouth Daily.   Yes Alis Richard MD   denosumab (Prolia) 60 MG/ML solution prefilled syringe syringe Inject 60 mg under the skin into the appropriate area as directed Every 6 (Six) Months.   Yes Alis Richard MD   dilTIAZem (TIAZAC) 240 MG 24 hr capsule Take 240 mg by mouth Daily. 9/21/21  Yes Alis Richard MD   famotidine (PEPCID) 40 MG tablet Take 40 mg by mouth Daily. 7/5/22  Yes Alis Richard MD   isosorbide mononitrate (IMDUR) 30 MG 24 hr tablet Take 30 mg by mouth 2 (Two) Times a Day. 1/18/21  Yes Alis Richard MD   omeprazole (priLOSEC) 40 MG capsule Take 1 capsule by mouth Daily. 3/8/22  Yes Diane Haynes MD   ProAir  (90 Base) MCG/ACT inhaler Inhale 1 puff Every 4 (Four) Hours As Needed for Wheezing or Shortness of Air. 4/4/22  Yes Alis Richard MD   cyproheptadine (PERIACTIN) 4 MG tablet Take 1 tablet by mouth Daily. 7/12/22   Diane Haynes MD   HM Chest Congestion Relief 400 MG tablet TAKE ONE TABLET BY MOUTH EVERY 4 TO 6 HOURS FOR COUGH AND CONGESTION AS NEEDED FOR FOR 7 DAYS 6/3/22   Alis Richard MD   linaclotide (LINZESS) 290 MCG capsule capsule Take 1 capsule by mouth Every Morning Before Breakfast. 3/8/22   Diane Haynes MD   megestrol (MEGACE) 20 MG tablet Take 1 tablet by mouth Daily for 30 days. 7/7/22 8/6/22  Diane Haynes MD   naproxen (NAPROSYN) 500 MG tablet Take 1 tablet by mouth 2 (Two) Times a Day As Needed for Moderate Pain . Take with food 6/27/22   Jackie Huynh APRN   nitroglycerin (NITROSTAT) 0.4 MG SL tablet PLACE 1 TABLET UNDER TONGUE EVERY FIVE MINUTES (MAX. OF 3) IF NO RELIEF SEEK ER 12/31/21   Provider, Historical, MD     Allergies   Allergen Reactions   • Doxycycline Swelling     Face and hands swell   • Penicillins Swelling      Facial and throat swelling   • Duloxetine Hcl Angioedema     Social History     Socioeconomic History   • Marital status:    Tobacco Use   • Smoking status: Current Every Day Smoker     Packs/day: 1.00     Years: 42.00     Pack years: 42.00     Types: Cigarettes   • Smokeless tobacco: Never Used   • Tobacco comment: Working with program in [place] to stop smoking   Vaping Use   • Vaping Use: Former   • Substances: Nicotine   • Devices: 03/08/2022 - Patient states device was refillable.   Substance and Sexual Activity   • Alcohol use: Not Currently     Comment: Patient confirmed she consumes 2 Beer per night to aid in sleep and drinks to celebrate birthdays.   • Drug use: Never   • Sexual activity: Defer       Review of Systems  Review of Systems   Constitutional: Positive for unexpected weight change. Negative for chills, fatigue and fever.   HENT: Negative for congestion, ear discharge, hearing loss, nosebleeds and sore throat.    Eyes: Negative for pain, discharge and redness.   Respiratory: Negative for cough, chest tightness, shortness of breath and wheezing.    Cardiovascular: Negative for chest pain and palpitations.   Gastrointestinal: Positive for abdominal pain. Negative for abdominal distention, blood in stool, constipation, diarrhea, nausea and vomiting.   Endocrine: Negative for cold intolerance, polydipsia, polyphagia and polyuria.   Genitourinary: Negative for dysuria, flank pain, frequency, hematuria and urgency.   Musculoskeletal: Negative for arthralgias, back pain, joint swelling and myalgias.   Skin: Negative for color change, pallor and rash.   Neurological: Negative for tremors, seizures, syncope, weakness and headaches.   Hematological: Negative for adenopathy. Does not bruise/bleed easily.   Psychiatric/Behavioral: Negative for behavioral problems, confusion, dysphoric mood, hallucinations and suicidal ideas. The patient is not nervous/anxious.         /82   Pulse 95    "Ht 157.5 cm (62\")   Wt 36 kg (79 lb 6.4 oz)   BMI 14.52 kg/m²     Objective    Physical Exam  Constitutional:       Appearance: She is well-developed.   HENT:      Head: Normocephalic and atraumatic.   Eyes:      Conjunctiva/sclera: Conjunctivae normal.      Pupils: Pupils are equal, round, and reactive to light.   Neck:      Thyroid: No thyromegaly.   Cardiovascular:      Rate and Rhythm: Normal rate and regular rhythm.      Heart sounds: Normal heart sounds. No murmur heard.  Pulmonary:      Effort: Pulmonary effort is normal.      Breath sounds: Normal breath sounds. No wheezing.   Abdominal:      General: Bowel sounds are normal. There is no distension.      Palpations: Abdomen is soft. There is no mass.      Tenderness: There is no abdominal tenderness.      Hernia: No hernia is present.   Genitourinary:     Comments: No lesions noted  Musculoskeletal:         General: No tenderness. Normal range of motion.      Cervical back: Normal range of motion and neck supple.   Lymphadenopathy:      Cervical: No cervical adenopathy.   Skin:     General: Skin is warm and dry.      Findings: No rash.   Neurological:      Mental Status: She is alert and oriented to person, place, and time.      Cranial Nerves: No cranial nerve deficit.   Psychiatric:         Thought Content: Thought content normal.       Lab on 05/02/2022   Component Date Value Ref Range Status   • Glucose 05/02/2022 117 (A) 65 - 99 mg/dL Final   • BUN 05/02/2022 8  6 - 20 mg/dL Final   • Creatinine 05/02/2022 0.47 (A) 0.57 - 1.00 mg/dL Final   • Sodium 05/02/2022 142  136 - 145 mmol/L Final   • Potassium 05/02/2022 3.4 (A) 3.5 - 5.2 mmol/L Final   • Chloride 05/02/2022 111 (A) 98 - 107 mmol/L Final   • CO2 05/02/2022 21.0 (A) 22.0 - 29.0 mmol/L Final   • Calcium 05/02/2022 8.8  8.6 - 10.5 mg/dL Final   • Total Protein 05/02/2022 6.5  6.0 - 8.5 g/dL Final   • Albumin 05/02/2022 4.00  3.50 - 5.20 g/dL Final   • ALT (SGPT) 05/02/2022 49 (A) 1 - 33 U/L Final "   • AST (SGOT) 05/02/2022 80 (A) 1 - 32 U/L Final   • Alkaline Phosphatase 05/02/2022 96  39 - 117 U/L Final   • Total Bilirubin 05/02/2022 0.2  0.0 - 1.2 mg/dL Final   • Globulin 05/02/2022 2.5  gm/dL Final   • A/G Ratio 05/02/2022 1.6  g/dL Final   • BUN/Creatinine Ratio 05/02/2022 17.0  7.0 - 25.0 Final   • Anion Gap 05/02/2022 10.0  5.0 - 15.0 mmol/L Final   • eGFR 05/02/2022 111.2  >60.0 mL/min/1.73 Final    National Kidney Foundation and American Society of Nephrology (ASN) Task Force recommended calculation based on the Chronic Kidney Disease Epidemiology Collaboration (CKD-EPI) equation refit without adjustment for race.   • Magnesium 05/02/2022 1.5 (A) 1.6 - 2.6 mg/dL Final   • Phosphorus 05/02/2022 3.6  2.5 - 4.5 mg/dL Final     Assessment & Plan      1. Generalized abdominal pain    2. Weight loss    1.  Abdominal pain with constipation, improving.  Likely due to irritable bowel syndrome.    Continue MiraLAX 17 g p.o. daily, high-fiber diet and Linzess 290 mcg p.o. daily.  Repeat colonoscopy in 5 years.  2.  Abdominal pain with weight loss, nausea and vomiting, improving somebut weight loss is persistent.  Continue Prilosec 40 mg p.o. daily.    Add Megace p.o. daily.  Obtain CT abdomen pelvis.  3.  Family history of colon cancer, patient needs high risk screening for colorectal neoplasia.    Repeat colonoscopy in 5 years.  4.    Mcneil's esophagus, continue PPI.  Antireflux lifestyle. repeat EGD  in 3 years for surveillance.  5.  Abdominal pain with nausea, improved.  Continue PPI.  Improving.  6.  Cough and chest pain, likely due to GERD.  Could also be due to pulmonary pathology.  Continue Prilosec daily.  Add Pepcid nightly.  CT chest if symptoms continue.  7.  Tobacco usage, recommend cessation.        Orders placed during this encounter include:  Orders Placed This Encounter   Procedures   • CT Abdomen Pelvis With Contrast     Standing Status:   Future     Standing Expiration Date:   7/7/2023      Order Specific Question:   Will Oral Contrast be needed for this procedure?     Answer:   Yes     Order Specific Question:   Release to patient     Answer:   Immediate       * Surgery not found *    Review and/or summary of lab tests, radiology, procedures, medications. Review and summary of old records and obtaining of history. The risks and benefits of my recommendations, as well as other treatment options were discussed with the patient today. Questions were answered.    New Medications Ordered This Visit   Medications   • megestrol (MEGACE) 20 MG tablet     Sig: Take 1 tablet by mouth Daily for 30 days.     Dispense:  30 tablet     Refill:  5       Follow-up: Return in about 1 month (around 8/7/2022).               Results for orders placed or performed in visit on 05/02/22   Phosphorus    Specimen: Blood   Result Value Ref Range    Phosphorus 3.6 2.5 - 4.5 mg/dL   Magnesium    Specimen: Blood   Result Value Ref Range    Magnesium 1.5 (L) 1.6 - 2.6 mg/dL   Comprehensive metabolic panel    Specimen: Blood   Result Value Ref Range    Glucose 117 (H) 65 - 99 mg/dL    BUN 8 6 - 20 mg/dL    Creatinine 0.47 (L) 0.57 - 1.00 mg/dL    Sodium 142 136 - 145 mmol/L    Potassium 3.4 (L) 3.5 - 5.2 mmol/L    Chloride 111 (H) 98 - 107 mmol/L    CO2 21.0 (L) 22.0 - 29.0 mmol/L    Calcium 8.8 8.6 - 10.5 mg/dL    Total Protein 6.5 6.0 - 8.5 g/dL    Albumin 4.00 3.50 - 5.20 g/dL    ALT (SGPT) 49 (H) 1 - 33 U/L    AST (SGOT) 80 (H) 1 - 32 U/L    Alkaline Phosphatase 96 39 - 117 U/L    Total Bilirubin 0.2 0.0 - 1.2 mg/dL    Globulin 2.5 gm/dL    A/G Ratio 1.6 g/dL    BUN/Creatinine Ratio 17.0 7.0 - 25.0    Anion Gap 10.0 5.0 - 15.0 mmol/L    eGFR 111.2 >60.0 mL/min/1.73   Results for orders placed or performed during the hospital encounter of 04/22/22   Tissue Pathology Exam    Specimen: A: Gastric, Antrum; Tissue    B: Esophagus; Tissue   Result Value Ref Range    Case Report       Surgical Pathology Report                          Case: [id number]                                  Authorizing Provider:  Diane Haynes MD      Collected:           04/22/2022 01:02 PM          Ordering Location:     [facility]   Received:            04/25/2022 07:59 AM                                 [facility]                                                     Pathologist:           Kenji Guzmán MD                                                             Specimens:   1) - Gastric, Antrum, KG                                                                            2) - Esophagus, EGJ         KG                                                             Final Diagnosis       See Scanned Report       Results for orders placed or performed in visit on 04/19/22   COVID-19, [facility] IN-HOUSE, NP SWAB IN TRANSPORT MEDIA 8-10 HR TAT - Swab, Nasopharynx    Specimen: Nasopharynx; Swab   Result Value Ref Range    COVID19 Not Detected Not Detected - Ref. Range   Results for orders placed or performed in visit on 02/01/22   Phosphorus    Specimen: Blood   Result Value Ref Range    Phosphorus 5.1 (H) 2.5 - 4.5 mg/dL   Magnesium    Specimen: Blood   Result Value Ref Range    Magnesium 1.7 1.6 - 2.6 mg/dL   Comprehensive Metabolic Panel    Specimen: Blood   Result Value Ref Range    Glucose 130 (H) 65 - 99 mg/dL    BUN 8 6 - 20 mg/dL    Creatinine 0.66 0.57 - 1.00 mg/dL    Sodium 141 136 - 145 mmol/L    Potassium 4.0 3.5 - 5.2 mmol/L    Chloride 104 98 - 107 mmol/L    CO2 26.0 22.0 - 29.0 mmol/L    Calcium 9.5 8.6 - 10.5 mg/dL    Total Protein 6.8 6.0 - 8.5 g/dL    Albumin 4.00 3.50 - 5.20 g/dL    ALT (SGPT) 22 1 - 33 U/L    AST (SGOT) 36 (H) 1 - 32 U/L    Alkaline Phosphatase 144 (H) 39 - 117 U/L    Total Bilirubin 0.4 0.0 - 1.2 mg/dL    eGFR Non African Amer 93 >60 mL/min/1.73    Globulin 2.8 gm/dL    A/G Ratio 1.4 g/dL    BUN/Creatinine Ratio 12.1 7.0 - 25.0    Anion Gap 11.0 5.0 - 15.0 mmol/L   Results for orders placed or performed in  visit on 12/16/21   Phosphorus    Specimen: Blood   Result Value Ref Range    Phosphorus 2.9 2.5 - 4.5 mg/dL   Magnesium    Specimen: Blood   Result Value Ref Range    Magnesium 1.7 1.6 - 2.6 mg/dL   Comprehensive metabolic panel    Specimen: Blood   Result Value Ref Range    Glucose 105 (H) 65 - 99 mg/dL    BUN 8 6 - 20 mg/dL    Creatinine 0.51 (L) 0.57 - 1.00 mg/dL    Sodium 139 136 - 145 mmol/L    Potassium 3.6 3.5 - 5.2 mmol/L    Chloride 103 98 - 107 mmol/L    CO2 28.0 22.0 - 29.0 mmol/L    Calcium 8.7 8.6 - 10.5 mg/dL    Total Protein 6.5 6.0 - 8.5 g/dL    Albumin 4.20 3.50 - 5.20 g/dL    ALT (SGPT) 18 1 - 33 U/L    AST (SGOT) 28 1 - 32 U/L    Alkaline Phosphatase 122 (H) 39 - 117 U/L    Total Bilirubin 0.4 0.0 - 1.2 mg/dL    eGFR Non African Amer 125 >60 mL/min/1.73    Globulin 2.3 gm/dL    A/G Ratio 1.8 g/dL    BUN/Creatinine Ratio 15.7 7.0 - 25.0    Anion Gap 8.0 5.0 - 15.0 mmol/L   Results for orders placed or performed in visit on 06/14/21   Vitamin D 25 Hydroxy    Specimen: Blood   Result Value Ref Range    25 Hydroxy, Vitamin D 54.1 ng/ml   Results for orders placed or performed in visit on 02/25/21   BUN    Specimen: Blood   Result Value Ref Range    BUN 6 6 - 20 mg/dL   Creatinine, Serum    Specimen: Blood   Result Value Ref Range    Creatinine 0.40 (L) 0.57 - 1.00 mg/dL    eGFR Non African Amer >150 >60 mL/min/1.73   Results for orders placed or performed during the hospital encounter of 02/09/21   Tissue Pathology Exam    Specimen: A: Small Intestine, Duodenum; Tissue    B: Gastric, Antrum; Tissue    C: Esophagus; Tissue   Result Value Ref Range    Case Report       Surgical Pathology Report                         Case: [id number]                                  Authorizing Provider:  Diane Haynes MD      Collected:           02/09/2021 02:22 PM          Ordering Location:     [facility]             Received:            02/10/2021 06:42 AM                                 [facility]                                                     Pathologist:           Farhat Velasco MD                                                          Specimens:   1) - Small Intestine, Duodenum, duodenum bx                                                         2) - Gastric, Antrum, antrum bx                                                                     3) - Esophagus, eg junction bx                                                             Final Diagnosis       SEE SCANNED REPORT       Results for orders placed or performed in visit on 02/06/21   COVID-19,[facility]SE, NP/OP SWAB IN UTM/VTM/SALINE TRANSPORT MEDIA,24 HR TAT - Swab, Nasopharynx    Specimen: Nasopharynx; Swab   Result Value Ref Range    COVID19 Not Detected Not Detected - Ref. Range   Results for orders placed or performed in visit on 12/14/20   CBC Auto Differential    Specimen: Arm, Right; Blood   Result Value Ref Range    WBC 8.55 3.40 - 10.80 10*3/mm3    RBC 4.11 3.77 - 5.28 10*6/mm3    Hemoglobin 13.5 12.0 - 15.9 g/dL    Hematocrit 39.7 34.0 - 46.6 %    MCV 96.6 79.0 - 97.0 fL    MCH 32.8 26.6 - 33.0 pg    MCHC 34.0 31.5 - 35.7 g/dL    RDW 12.1 (L) 12.3 - 15.4 %    RDW-SD 42.6 37.0 - 54.0 fl    MPV 9.5 6.0 - 12.0 fL    Platelets 216 140 - 450 10*3/mm3    Neutrophil % 58.0 42.7 - 76.0 %    Lymphocyte % 31.7 19.6 - 45.3 %    Monocyte % 8.0 5.0 - 12.0 %    Eosinophil % 1.3 0.3 - 6.2 %    Basophil % 0.6 0.0 - 1.5 %    Immature Grans % 0.4 0.0 - 0.5 %    Neutrophils, Absolute 4.97 1.70 - 7.00 10*3/mm3    Lymphocytes, Absolute 2.71 0.70 - 3.10 10*3/mm3    Monocytes, Absolute 0.68 0.10 - 0.90 10*3/mm3    Eosinophils, Absolute 0.11 0.00 - 0.40 10*3/mm3    Basophils, Absolute 0.05 0.00 - 0.20 10*3/mm3    Immature Grans, Absolute 0.03 0.00 - 0.05 10*3/mm3    nRBC 0.0 0.0 - 0.2 /100 WBC   Phosphorus    Specimen: Arm, Right; Blood   Result Value Ref Range    Phosphorus 3.6 2.5 - 4.5 mg/dL   Magnesium    Specimen: Arm,  Right; Blood   Result Value Ref Range    Magnesium 1.5 (L) 1.6 - 2.6 mg/dL   Comprehensive Metabolic Panel    Specimen: Arm, Right; Blood   Result Value Ref Range    Glucose 124 (H) 65 - 99 mg/dL    BUN 8 6 - 20 mg/dL    Creatinine 0.48 (L) 0.57 - 1.00 mg/dL    Sodium 140 136 - 145 mmol/L    Potassium 3.7 3.5 - 5.2 mmol/L    Chloride 106 98 - 107 mmol/L    CO2 24.0 22.0 - 29.0 mmol/L    Calcium 9.1 8.6 - 10.5 mg/dL    Total Protein 6.8 6.0 - 8.5 g/dL    Albumin 4.00 3.50 - 5.20 g/dL    ALT (SGPT) 24 1 - 33 U/L    AST (SGOT) 28 1 - 32 U/L    Alkaline Phosphatase 123 (H) 39 - 117 U/L    Total Bilirubin 0.5 0.0 - 1.2 mg/dL    eGFR Non African Amer 134 >60 mL/min/1.73    Globulin 2.8 gm/dL    A/G Ratio 1.4 g/dL    BUN/Creatinine Ratio 16.7 7.0 - 25.0    Anion Gap 10.0 5.0 - 15.0 mmol/L     *Note: Due to a large number of results and/or encounters for the requested time period, some results have not been displayed. A complete set of results can be found in Results Review.         This document has been electronically signed by Diane Haynes MD on July 14, 2022 13:04 CDT

## 2022-07-18 ENCOUNTER — TRANSCRIBE ORDERS (OUTPATIENT)
Dept: GENERAL RADIOLOGY | Facility: HOSPITAL | Age: 57
End: 2022-07-18

## 2022-07-18 DIAGNOSIS — R10.84 GENERALIZED ABDOMINAL PAIN: Primary | ICD-10-CM

## 2022-07-25 ENCOUNTER — APPOINTMENT (OUTPATIENT)
Dept: CT IMAGING | Facility: HOSPITAL | Age: 57
End: 2022-07-25

## 2022-07-27 DIAGNOSIS — R10.84 GENERALIZED ABDOMINAL PAIN: Primary | ICD-10-CM

## 2022-07-27 DIAGNOSIS — R63.4 WEIGHT LOSS: ICD-10-CM

## 2022-08-02 ENCOUNTER — APPOINTMENT (OUTPATIENT)
Dept: LAB | Facility: HOSPITAL | Age: 57
End: 2022-08-02

## 2022-08-02 ENCOUNTER — LAB (OUTPATIENT)
Dept: LAB | Facility: HOSPITAL | Age: 57
End: 2022-08-02

## 2022-08-02 DIAGNOSIS — M81.0 AGE-RELATED OSTEOPOROSIS WITHOUT CURRENT PATHOLOGICAL FRACTURE: ICD-10-CM

## 2022-08-02 DIAGNOSIS — M81.0 AGE-RELATED OSTEOPOROSIS WITHOUT CURRENT PATHOLOGICAL FRACTURE: Primary | ICD-10-CM

## 2022-08-02 LAB
CALCIUM SPEC-SCNC: 8.9 MG/DL (ref 8.6–10.5)
MAGNESIUM SERPL-MCNC: 2 MG/DL (ref 1.6–2.6)
PHOSPHATE SERPL-MCNC: 5.9 MG/DL (ref 2.5–4.5)

## 2022-08-02 PROCEDURE — 84100 ASSAY OF PHOSPHORUS: CPT

## 2022-08-02 PROCEDURE — 82310 ASSAY OF CALCIUM: CPT

## 2022-08-02 PROCEDURE — 83735 ASSAY OF MAGNESIUM: CPT

## 2022-08-09 ENCOUNTER — INFUSION (OUTPATIENT)
Dept: ONCOLOGY | Facility: HOSPITAL | Age: 57
End: 2022-08-09

## 2022-08-09 VITALS
HEART RATE: 89 BPM | DIASTOLIC BLOOD PRESSURE: 72 MMHG | TEMPERATURE: 97.5 F | SYSTOLIC BLOOD PRESSURE: 131 MMHG | RESPIRATION RATE: 18 BRPM

## 2022-08-09 DIAGNOSIS — M81.0 SENILE OSTEOPOROSIS: Primary | ICD-10-CM

## 2022-08-09 PROCEDURE — 96372 THER/PROPH/DIAG INJ SC/IM: CPT | Performed by: NURSE PRACTITIONER

## 2022-08-09 PROCEDURE — 25010000002 DENOSUMAB 60 MG/ML SOLUTION PREFILLED SYRINGE: Performed by: NURSE PRACTITIONER

## 2022-08-09 RX ADMIN — DENOSUMAB 60 MG: 60 INJECTION SUBCUTANEOUS at 13:18

## 2022-08-11 ENCOUNTER — HOSPITAL ENCOUNTER (OUTPATIENT)
Dept: ULTRASOUND IMAGING | Facility: HOSPITAL | Age: 57
Discharge: HOME | End: 2022-08-11
Admitting: INTERNAL MEDICINE

## 2022-08-11 PROCEDURE — 76700 US EXAM ABDOM COMPLETE: CPT

## 2022-08-19 ENCOUNTER — OFFICE VISIT (OUTPATIENT)
Dept: GASTROENTEROLOGY | Facility: CLINIC | Age: 57
End: 2022-08-19

## 2022-08-19 VITALS
SYSTOLIC BLOOD PRESSURE: 158 MMHG | HEIGHT: 62 IN | DIASTOLIC BLOOD PRESSURE: 87 MMHG | HEART RATE: 86 BPM | WEIGHT: 79.8 LBS | BODY MASS INDEX: 14.69 KG/M2

## 2022-08-19 DIAGNOSIS — R10.84 GENERALIZED ABDOMINAL PAIN: Primary | ICD-10-CM

## 2022-08-19 DIAGNOSIS — R63.4 WEIGHT LOSS: ICD-10-CM

## 2022-08-19 PROBLEM — I10 HYPERTENSION: Status: ACTIVE | Noted: 2021-01-04

## 2022-08-19 PROBLEM — Z20.9 EXPOSURE TO COMMUNICABLE DISEASE: Status: ACTIVE | Noted: 2022-08-19

## 2022-08-19 PROBLEM — E78.5 HYPERLIPIDEMIA: Status: ACTIVE | Noted: 2021-01-04

## 2022-08-19 PROBLEM — F41.8 MIXED ANXIETY AND DEPRESSIVE DISORDER: Status: ACTIVE | Noted: 2021-04-12

## 2022-08-19 PROBLEM — R92.8 ABNORMAL MAMMOGRAM: Status: ACTIVE | Noted: 2021-09-15

## 2022-08-19 PROBLEM — R53.83 FATIGUE: Status: ACTIVE | Noted: 2021-08-04

## 2022-08-19 PROCEDURE — 99213 OFFICE O/P EST LOW 20 MIN: CPT | Performed by: INTERNAL MEDICINE

## 2022-08-24 DIAGNOSIS — R10.84 GENERALIZED ABDOMINAL PAIN: Primary | ICD-10-CM

## 2022-08-24 DIAGNOSIS — R63.4 WEIGHT LOSS: ICD-10-CM

## 2022-08-24 DIAGNOSIS — R11.0 NAUSEA: ICD-10-CM

## 2022-08-26 ENCOUNTER — LAB (OUTPATIENT)
Dept: LAB | Facility: HOSPITAL | Age: 57
End: 2022-08-26

## 2022-08-26 DIAGNOSIS — R63.4 WEIGHT LOSS: ICD-10-CM

## 2022-08-26 DIAGNOSIS — R10.84 GENERALIZED ABDOMINAL PAIN: ICD-10-CM

## 2022-08-26 DIAGNOSIS — R11.0 NAUSEA: ICD-10-CM

## 2022-08-26 LAB
BASOPHILS # BLD AUTO: 0.05 10*3/MM3 (ref 0–0.2)
BASOPHILS NFR BLD AUTO: 0.8 % (ref 0–1.5)
DEPRECATED RDW RBC AUTO: 45.5 FL (ref 37–54)
EOSINOPHIL # BLD AUTO: 0.09 10*3/MM3 (ref 0–0.4)
EOSINOPHIL NFR BLD AUTO: 1.5 % (ref 0.3–6.2)
ERYTHROCYTE [DISTWIDTH] IN BLOOD BY AUTOMATED COUNT: 13 % (ref 12.3–15.4)
HCT VFR BLD AUTO: 41.8 % (ref 34–46.6)
HGB BLD-MCNC: 13.8 G/DL (ref 12–15.9)
IMM GRANULOCYTES # BLD AUTO: 0.01 10*3/MM3 (ref 0–0.05)
IMM GRANULOCYTES NFR BLD AUTO: 0.2 % (ref 0–0.5)
LYMPHOCYTES # BLD AUTO: 2.03 10*3/MM3 (ref 0.7–3.1)
LYMPHOCYTES NFR BLD AUTO: 34.4 % (ref 19.6–45.3)
MCH RBC QN AUTO: 31.4 PG (ref 26.6–33)
MCHC RBC AUTO-ENTMCNC: 33 G/DL (ref 31.5–35.7)
MCV RBC AUTO: 95.2 FL (ref 79–97)
MONOCYTES # BLD AUTO: 0.51 10*3/MM3 (ref 0.1–0.9)
MONOCYTES NFR BLD AUTO: 8.6 % (ref 5–12)
NEUTROPHILS NFR BLD AUTO: 3.21 10*3/MM3 (ref 1.7–7)
NEUTROPHILS NFR BLD AUTO: 54.5 % (ref 42.7–76)
NRBC BLD AUTO-RTO: 0 /100 WBC (ref 0–0.2)
PLATELET # BLD AUTO: 215 10*3/MM3 (ref 140–450)
PMV BLD AUTO: 9.7 FL (ref 6–12)
RBC # BLD AUTO: 4.39 10*6/MM3 (ref 3.77–5.28)
TSH SERPL DL<=0.05 MIU/L-ACNC: 0.43 UIU/ML (ref 0.27–4.2)
WBC NRBC COR # BLD: 5.9 10*3/MM3 (ref 3.4–10.8)

## 2022-08-26 PROCEDURE — 85025 COMPLETE CBC W/AUTO DIFF WBC: CPT

## 2022-08-26 PROCEDURE — 36415 COLL VENOUS BLD VENIPUNCTURE: CPT | Performed by: INTERNAL MEDICINE

## 2022-08-26 PROCEDURE — 84443 ASSAY THYROID STIM HORMONE: CPT | Performed by: INTERNAL MEDICINE

## 2022-08-26 PROCEDURE — 81003 URINALYSIS AUTO W/O SCOPE: CPT

## 2022-08-26 NOTE — PROGRESS NOTES
Chief Complaint   Patient presents with   • Abdominal Pain       Subjective    Muna Zayas is a 57 y.o. female.    History of Present Illness  Patient presented to GI clinic for follow-up visit today.  Feels some better currently.  Has continued symptoms of epigastric pain and weight loss.  Denies nausea or vomiting.  Abdominal ultrasound was consistent with cholelithiasis.       The following portions of the patient's history were reviewed and updated as appropriate:   Past Medical History:   Diagnosis Date   • COPD (chronic obstructive pulmonary disease) (HCC)    • Coronary artery disease    • Hypertension      Past Surgical History:   Procedure Laterality Date   • COLONOSCOPY N/A 2021    Procedure: COLONOSCOPY;  Surgeon: Diane Haynes MD;  Location: [facility] ENDOSCOPY;  Service: Gastroenterology;  Laterality: N/A;   • CORONARY ANGIOPLASTY WITH STENT PLACEMENT     • ENDOSCOPY N/A 2021    Procedure: ESOPHAGOGASTRODUODENOSCOPY;  Surgeon: Diane Haynes MD;  Location: [facility] ENDOSCOPY;  Service: Gastroenterology;  Laterality: N/A;   • ENDOSCOPY  2022   • ENDOSCOPY N/A 2022    Procedure: ESOPHAGOGASTRODUODENOSCOPY;  Surgeon: Diane Haynes MD;  Location: [facility] ENDOSCOPY;  Service: Gastroenterology;  Laterality: N/A;   • NASAL SEPTUM SURGERY     • UPPER GASTROINTESTINAL ENDOSCOPY  2021     Family History   Problem Relation Age of Onset   • Colon cancer Paternal Grandmother      OB History        3    Para   3    Term   3            AB        Living           SAB        IAB        Ectopic        Molar        Multiple        Live Births                  Prior to Admission medications    Medication Sig Start Date End Date Taking? Authorizing Provider   aspirin 81 MG chewable tablet Chew 81 mg Daily.   Yes ProviderAlis MD   atorvastatin (LIPITOR) 10 MG tablet Take 10 mg by mouth Daily. 21  Yes ProviderAlis MD   Breelena Ellipta 100-25 MCG/INH  inhaler Inhale 1 puff Daily. 4/4/22  Yes Alis Richard MD   CALCIUM-VITAMIN D PO Take 1 tablet by mouth Daily.   Yes Alis Richard MD   cyproheptadine (PERIACTIN) 4 MG tablet Take 1 tablet by mouth Daily. 7/12/22  Yes Diane Haynes MD   denosumab (Prolia) 60 MG/ML solution prefilled syringe syringe Inject 60 mg under the skin into the appropriate area as directed Every 6 (Six) Months.   Yes Alis Richard MD   dilTIAZem (TIAZAC) 240 MG 24 hr capsule Take 240 mg by mouth Daily. 9/21/21  Yes Alis Richard MD   famotidine (PEPCID) 40 MG tablet Take 40 mg by mouth Daily. 7/5/22  Yes Alis Richard MD   HM Chest Congestion Relief 400 MG tablet TAKE ONE TABLET BY MOUTH EVERY 4 TO 6 HOURS FOR COUGH AND CONGESTION AS NEEDED FOR FOR 7 DAYS 6/3/22  Yes Alis Richard MD   isosorbide mononitrate (IMDUR) 30 MG 24 hr tablet Take 30 mg by mouth 2 (Two) Times a Day. 1/18/21  Yes Alis Richard MD   linaclotide (LINZESS) 290 MCG capsule capsule Take 1 capsule by mouth Every Morning Before Breakfast. 8/5/22  Yes Diane Haynes MD   naproxen (NAPROSYN) 500 MG tablet Take 1 tablet by mouth 2 (Two) Times a Day As Needed for Moderate Pain . Take with food 6/27/22  Yes Jackie Huynh APRN   nitroglycerin (NITROSTAT) 0.4 MG SL tablet PLACE 1 TABLET UNDER TONGUE EVERY FIVE MINUTES (MAX. OF 3) IF NO RELIEF SEEK ER 12/31/21  Yes Alis Richard MD   omeprazole (priLOSEC) 40 MG capsule Take 1 capsule by mouth Daily. 3/8/22  Yes Diane Haynes MD   ProAir  (90 Base) MCG/ACT inhaler Inhale 1 puff Every 4 (Four) Hours As Needed for Wheezing or Shortness of Air. 4/4/22  Yes Alis Richard MD     Allergies   Allergen Reactions   • Doxycycline Swelling     Face and hands swell   • Penicillins Swelling     Facial and throat swelling   • Duloxetine Hcl Angioedema     Social History     Socioeconomic History   • Marital status:    Tobacco Use   •  "Smoking status: Current Every Day Smoker     Packs/day: 1.00     Years: 42.00     Pack years: 42.00     Types: Cigarettes   • Smokeless tobacco: Never Used   • Tobacco comment: Working with program in [place] to stop smoking   Vaping Use   • Vaping Use: Former   • Substances: Nicotine   • Devices: 03/08/2022 - Patient states device was refillable.   Substance and Sexual Activity   • Alcohol use: Not Currently     Comment: Patient confirmed she consumes 2 Beer per night to aid in sleep and drinks to celebrate birthdays.   • Drug use: Never   • Sexual activity: Defer       Review of Systems  Review of Systems   Constitutional: Positive for unexpected weight change. Negative for chills, fatigue and fever.   HENT: Negative for congestion, ear discharge, hearing loss, nosebleeds and sore throat.    Eyes: Negative for pain, discharge and redness.   Respiratory: Negative for cough, chest tightness, shortness of breath and wheezing.    Cardiovascular: Negative for chest pain and palpitations.   Gastrointestinal: Positive for abdominal pain. Negative for abdominal distention, blood in stool, constipation, diarrhea, nausea and vomiting.   Endocrine: Negative for cold intolerance, polydipsia, polyphagia and polyuria.   Genitourinary: Negative for dysuria, flank pain, frequency, hematuria and urgency.   Musculoskeletal: Negative for arthralgias, back pain, joint swelling and myalgias.   Skin: Negative for color change, pallor and rash.   Neurological: Negative for tremors, seizures, syncope, weakness and headaches.   Hematological: Negative for adenopathy. Does not bruise/bleed easily.   Psychiatric/Behavioral: Negative for behavioral problems, confusion, dysphoric mood, hallucinations and suicidal ideas. The patient is not nervous/anxious.         /87 (BP Location: Left arm)   Pulse 86   Ht 157.5 cm (62\")   Wt 36.2 kg (79 lb 12.8 oz)   BMI 14.60 kg/m²     Objective    Physical Exam  Constitutional:       " Appearance: She is well-developed.   HENT:      Head: Normocephalic and atraumatic.   Eyes:      Conjunctiva/sclera: Conjunctivae normal.      Pupils: Pupils are equal, round, and reactive to light.   Neck:      Thyroid: No thyromegaly.   Cardiovascular:      Rate and Rhythm: Normal rate and regular rhythm.      Heart sounds: Normal heart sounds. No murmur heard.  Pulmonary:      Effort: Pulmonary effort is normal.      Breath sounds: Normal breath sounds. No wheezing.   Abdominal:      General: Bowel sounds are normal. There is no distension.      Palpations: Abdomen is soft. There is no mass.      Tenderness: There is no abdominal tenderness.      Hernia: No hernia is present.   Genitourinary:     Comments: No lesions noted  Musculoskeletal:         General: No tenderness. Normal range of motion.      Cervical back: Normal range of motion and neck supple.   Lymphadenopathy:      Cervical: No cervical adenopathy.   Skin:     General: Skin is warm and dry.      Findings: No rash.   Neurological:      Mental Status: She is alert and oriented to person, place, and time.      Cranial Nerves: No cranial nerve deficit.   Psychiatric:         Thought Content: Thought content normal.       Lab on 08/02/2022   Component Date Value Ref Range Status   • Calcium 08/02/2022 8.9  8.6 - 10.5 mg/dL Final   • Magnesium 08/02/2022 2.0  1.6 - 2.6 mg/dL Final   • Phosphorus 08/02/2022 5.9 (A) 2.5 - 4.5 mg/dL Final     Assessment & Plan      1. Generalized abdominal pain    2. Weight loss    1.  Abdominal pain with constipation, likely due to irritable bowel syndrome.    Continue MiraLAX 17 g p.o. daily, high-fiber diet and Linzess 290 mcg p.o. daily.  Repeat colonoscopy in 5 years.  2.  Abdominal pain with weight loss, nausea and vomiting, improving somebut weight loss is persistent.  Continue Prilosec 40 mg p.o. daily.    Add Megace p.o. daily.  Obtain CT abdomen pelvis.  Refer to surgery for cholecystectomy if CT is  unremarkable.  3.  Family history of colon cancer, patient needs high risk screening for colorectal neoplasia.    Repeat colonoscopy in 5 years.  4.    Mcneil's esophagus, continue PPI.  Antireflux lifestyle. repeat EGD  in 3 years for surveillance.  5.nausea, improved.  Continue PPI.    6. Tobacco usage, recommend cessation.           Orders placed during this encounter include:  Orders Placed This Encounter   Procedures   • CT Abdomen Pelvis With Contrast     Standing Status:   Future     Standing Expiration Date:   8/19/2023     Order Specific Question:   Will Oral Contrast be needed for this procedure?     Answer:   Yes     Order Specific Question:   Release to patient     Answer:   Immediate       * Surgery not found *    Review and/or summary of lab tests, radiology, procedures, medications. Review and summary of old records and obtaining of history. The risks and benefits of my recommendations, as well as other treatment options were discussed with the patient today. Questions were answered.    No orders of the defined types were placed in this encounter.      Follow-up: Return in about 1 month (around 9/19/2022).               Results for orders placed or performed in visit on 08/02/22   Phosphorus    Specimen: Blood   Result Value Ref Range    Phosphorus 5.9 (H) 2.5 - 4.5 mg/dL   Magnesium    Specimen: Blood   Result Value Ref Range    Magnesium 2.0 1.6 - 2.6 mg/dL   Calcium    Specimen: Blood   Result Value Ref Range    Calcium 8.9 8.6 - 10.5 mg/dL   Results for orders placed or performed in visit on 05/02/22   Phosphorus    Specimen: Blood   Result Value Ref Range    Phosphorus 3.6 2.5 - 4.5 mg/dL   Magnesium    Specimen: Blood   Result Value Ref Range    Magnesium 1.5 (L) 1.6 - 2.6 mg/dL   Comprehensive metabolic panel    Specimen: Blood   Result Value Ref Range    Glucose 117 (H) 65 - 99 mg/dL    BUN 8 6 - 20 mg/dL    Creatinine 0.47 (L) 0.57 - 1.00 mg/dL    Sodium 142 136 - 145 mmol/L    Potassium 3.4  (L) 3.5 - 5.2 mmol/L    Chloride 111 (H) 98 - 107 mmol/L    CO2 21.0 (L) 22.0 - 29.0 mmol/L    Calcium 8.8 8.6 - 10.5 mg/dL    Total Protein 6.5 6.0 - 8.5 g/dL    Albumin 4.00 3.50 - 5.20 g/dL    ALT (SGPT) 49 (H) 1 - 33 U/L    AST (SGOT) 80 (H) 1 - 32 U/L    Alkaline Phosphatase 96 39 - 117 U/L    Total Bilirubin 0.2 0.0 - 1.2 mg/dL    Globulin 2.5 gm/dL    A/G Ratio 1.6 g/dL    BUN/Creatinine Ratio 17.0 7.0 - 25.0    Anion Gap 10.0 5.0 - 15.0 mmol/L    eGFR 111.2 >60.0 mL/min/1.73   Results for orders placed or performed during the hospital encounter of 04/22/22   Tissue Pathology Exam    Specimen: A: Gastric, Antrum; Tissue    B: Esophagus; Tissue   Result Value Ref Range    Case Report       Surgical Pathology Report                         Case: [id number]                                  Authorizing Provider:  Diane Haynes MD      Collected:           04/22/2022 01:02 PM          Ordering Location:     [facility]   Received:            04/25/2022 07:59 AM                                 [facility]                                                     Pathologist:           Kenji Guzmán MD                                                             Specimens:   1) - Gastric, Antrum, KG                                                                            2) - Esophagus, EGJ         KG                                                             Final Diagnosis       See Scanned Report       Results for orders placed or performed in visit on 04/19/22   COVID-19, [facility] IN-HOUSE, NP SWAB IN TRANSPORT MEDIA 8-10 HR TAT - Swab, Nasopharynx    Specimen: Nasopharynx; Swab   Result Value Ref Range    COVID19 Not Detected Not Detected - Ref. Range   Results for orders placed or performed in visit on 02/01/22   Phosphorus    Specimen: Blood   Result Value Ref Range    Phosphorus 5.1 (H) 2.5 - 4.5 mg/dL   Magnesium    Specimen: Blood   Result Value Ref Range    Magnesium 1.7 1.6 - 2.6  mg/dL   Comprehensive Metabolic Panel    Specimen: Blood   Result Value Ref Range    Glucose 130 (H) 65 - 99 mg/dL    BUN 8 6 - 20 mg/dL    Creatinine 0.66 0.57 - 1.00 mg/dL    Sodium 141 136 - 145 mmol/L    Potassium 4.0 3.5 - 5.2 mmol/L    Chloride 104 98 - 107 mmol/L    CO2 26.0 22.0 - 29.0 mmol/L    Calcium 9.5 8.6 - 10.5 mg/dL    Total Protein 6.8 6.0 - 8.5 g/dL    Albumin 4.00 3.50 - 5.20 g/dL    ALT (SGPT) 22 1 - 33 U/L    AST (SGOT) 36 (H) 1 - 32 U/L    Alkaline Phosphatase 144 (H) 39 - 117 U/L    Total Bilirubin 0.4 0.0 - 1.2 mg/dL    eGFR Non African Amer 93 >60 mL/min/1.73    Globulin 2.8 gm/dL    A/G Ratio 1.4 g/dL    BUN/Creatinine Ratio 12.1 7.0 - 25.0    Anion Gap 11.0 5.0 - 15.0 mmol/L   Results for orders placed or performed in visit on 12/16/21   Phosphorus    Specimen: Blood   Result Value Ref Range    Phosphorus 2.9 2.5 - 4.5 mg/dL   Magnesium    Specimen: Blood   Result Value Ref Range    Magnesium 1.7 1.6 - 2.6 mg/dL   Comprehensive metabolic panel    Specimen: Blood   Result Value Ref Range    Glucose 105 (H) 65 - 99 mg/dL    BUN 8 6 - 20 mg/dL    Creatinine 0.51 (L) 0.57 - 1.00 mg/dL    Sodium 139 136 - 145 mmol/L    Potassium 3.6 3.5 - 5.2 mmol/L    Chloride 103 98 - 107 mmol/L    CO2 28.0 22.0 - 29.0 mmol/L    Calcium 8.7 8.6 - 10.5 mg/dL    Total Protein 6.5 6.0 - 8.5 g/dL    Albumin 4.20 3.50 - 5.20 g/dL    ALT (SGPT) 18 1 - 33 U/L    AST (SGOT) 28 1 - 32 U/L    Alkaline Phosphatase 122 (H) 39 - 117 U/L    Total Bilirubin 0.4 0.0 - 1.2 mg/dL    eGFR Non African Amer 125 >60 mL/min/1.73    Globulin 2.3 gm/dL    A/G Ratio 1.8 g/dL    BUN/Creatinine Ratio 15.7 7.0 - 25.0    Anion Gap 8.0 5.0 - 15.0 mmol/L   Results for orders placed or performed in visit on 06/14/21   Vitamin D 25 Hydroxy    Specimen: Blood   Result Value Ref Range    25 Hydroxy, Vitamin D 54.1 ng/ml   Results for orders placed or performed in visit on 02/25/21   BUN    Specimen: Blood   Result Value Ref Range    BUN 6 6 -  20 mg/dL   Creatinine, Serum    Specimen: Blood   Result Value Ref Range    Creatinine 0.40 (L) 0.57 - 1.00 mg/dL    eGFR Non African Amer >150 >60 mL/min/1.73   Results for orders placed or performed during the hospital encounter of 02/09/21   Tissue Pathology Exam    Specimen: A: Small Intestine, Duodenum; Tissue    B: Gastric, Antrum; Tissue    C: Esophagus; Tissue   Result Value Ref Range    Case Report       Surgical Pathology Report                         Case: [id number]                                  Authorizing Provider:  Diane Haynes MD      Collected:           02/09/2021 02:22 PM          Ordering Location:     [facility]             Received:            02/10/2021 06:42 AM                                 [place] [facility]                                                     Pathologist:           Farhat Velasco MD                                                          Specimens:   1) - Small Intestine, Duodenum, duodenum bx                                                         2) - Gastric, Antrum, antrum bx                                                                     3) - Esophagus, eg junction bx                                                             Final Diagnosis       SEE SCANNED REPORT       Results for orders placed or performed in visit on 02/06/21   COVID-19,[facility] IN-HOUSE, NP/OP SWAB IN UTM/VTM/SALINE TRANSPORT MEDIA,24 HR TAT - Swab, Nasopharynx    Specimen: Nasopharynx; Swab   Result Value Ref Range    COVID19 Not Detected Not Detected - Ref. Range   Results for orders placed or performed in visit on 12/14/20   CBC Auto Differential    Specimen: Arm, Right; Blood   Result Value Ref Range    WBC 8.55 3.40 - 10.80 10*3/mm3    RBC 4.11 3.77 - 5.28 10*6/mm3    Hemoglobin 13.5 12.0 - 15.9 g/dL    Hematocrit 39.7 34.0 - 46.6 %    MCV 96.6 79.0 - 97.0 fL    MCH 32.8 26.6 - 33.0 pg    MCHC 34.0 31.5 - 35.7 g/dL    RDW 12.1 (L) 12.3 - 15.4 %     RDW-SD 42.6 37.0 - 54.0 fl    MPV 9.5 6.0 - 12.0 fL    Platelets 216 140 - 450 10*3/mm3    Neutrophil % 58.0 42.7 - 76.0 %    Lymphocyte % 31.7 19.6 - 45.3 %    Monocyte % 8.0 5.0 - 12.0 %    Eosinophil % 1.3 0.3 - 6.2 %    Basophil % 0.6 0.0 - 1.5 %    Immature Grans % 0.4 0.0 - 0.5 %    Neutrophils, Absolute 4.97 1.70 - 7.00 10*3/mm3    Lymphocytes, Absolute 2.71 0.70 - 3.10 10*3/mm3    Monocytes, Absolute 0.68 0.10 - 0.90 10*3/mm3    Eosinophils, Absolute 0.11 0.00 - 0.40 10*3/mm3    Basophils, Absolute 0.05 0.00 - 0.20 10*3/mm3    Immature Grans, Absolute 0.03 0.00 - 0.05 10*3/mm3    nRBC 0.0 0.0 - 0.2 /100 WBC     *Note: Due to a large number of results and/or encounters for the requested time period, some results have not been displayed. A complete set of results can be found in Results Review.         This document has been electronically signed by Diane Haynes MD on August 26, 2022 06:46 CDT

## 2022-08-27 LAB
BILIRUB UR QL STRIP: NEGATIVE
CLARITY UR: CLEAR
COLOR UR: YELLOW
GLUCOSE UR STRIP-MCNC: NEGATIVE MG/DL
HGB UR QL STRIP.AUTO: NEGATIVE
KETONES UR QL STRIP: NEGATIVE
LEUKOCYTE ESTERASE UR QL STRIP.AUTO: NEGATIVE
NITRITE UR QL STRIP: NEGATIVE
PH UR STRIP.AUTO: 6 [PH] (ref 5–8)
PROT UR QL STRIP: NEGATIVE
SP GR UR STRIP: 1.01 (ref 1–1.03)
UROBILINOGEN UR QL STRIP: NORMAL

## 2022-09-01 ENCOUNTER — LAB (OUTPATIENT)
Dept: LAB | Facility: HOSPITAL | Age: 57
End: 2022-09-01

## 2022-09-01 ENCOUNTER — HOSPITAL ENCOUNTER (OUTPATIENT)
Dept: CT IMAGING | Facility: HOSPITAL | Age: 57
Discharge: HOME | End: 2022-09-01

## 2022-09-01 DIAGNOSIS — R10.84 GENERALIZED ABDOMINAL PAIN: ICD-10-CM

## 2022-09-01 DIAGNOSIS — R63.4 WEIGHT LOSS: ICD-10-CM

## 2022-09-01 LAB
BUN SERPL-MCNC: 11 MG/DL (ref 6–20)
CREAT SERPL-MCNC: 0.45 MG/DL (ref 0.57–1)
EGFRCR SERPLBLD CKD-EPI 2021: 112.4 ML/MIN/1.73

## 2022-09-01 PROCEDURE — 25010000002 IOPAMIDOL 61 % SOLUTION: Performed by: INTERNAL MEDICINE

## 2022-09-01 PROCEDURE — 74177 CT ABD & PELVIS W/CONTRAST: CPT

## 2022-09-01 PROCEDURE — 36415 COLL VENOUS BLD VENIPUNCTURE: CPT

## 2022-09-01 PROCEDURE — 84520 ASSAY OF UREA NITROGEN: CPT

## 2022-09-01 PROCEDURE — 82565 ASSAY OF CREATININE: CPT

## 2022-09-01 RX ADMIN — IOPAMIDOL 75 ML: 612 INJECTION, SOLUTION INTRAVENOUS at 15:21

## 2022-09-13 ENCOUNTER — OFFICE VISIT (OUTPATIENT)
Dept: GASTROENTEROLOGY | Facility: CLINIC | Age: 57
End: 2022-09-13

## 2022-09-13 VITALS — BODY MASS INDEX: 14.98 KG/M2 | WEIGHT: 81.4 LBS | HEIGHT: 62 IN

## 2022-09-13 DIAGNOSIS — R10.84 GENERALIZED ABDOMINAL PAIN: ICD-10-CM

## 2022-09-13 DIAGNOSIS — R63.4 WEIGHT LOSS: Primary | ICD-10-CM

## 2022-09-13 PROCEDURE — 99213 OFFICE O/P EST LOW 20 MIN: CPT | Performed by: INTERNAL MEDICINE

## 2022-09-21 NOTE — INTERVAL H&P NOTE
To whom it may concern.      Darya BOX Alfonso      1993           Patient is seen in the clinic 9/21/2022.she has history of cardiomyopathy,no recent follow up with cardiology.  I have suggested her to see cardiology and get evaluation done,if approbate , she can follow up for start of Covid vaccine , once cardiac evaluation is done.                    Sincerely,         Ruben Robertson MD    9/21/2022                                   H&P reviewed. The patient was examined and there are no changes to the H&P.

## 2022-10-01 NOTE — PROGRESS NOTES
Chief Complaint   Patient presents with   • Follow-up     CT scan follow up       Subjective    Muna Zayas is a 57 y.o. female.    History of Present Illness  Patient presented to GI clinic for follow-up visit today.  Feels better currently.  Has left foot pain.  Abdominal pain has improved.  Denies nausea or vomiting.  Bowel movements are regular.  Gained 2 pounds weight since last visit.  CT abdomen pelvis was unremarkable.       The following portions of the patient's history were reviewed and updated as appropriate:   Past Medical History:   Diagnosis Date   • COPD (chronic obstructive pulmonary disease) (HCC)    • Coronary artery disease    • Hypertension      Past Surgical History:   Procedure Laterality Date   • COLONOSCOPY N/A 2021    Procedure: COLONOSCOPY;  Surgeon: Diane Haynes MD;  Location: [facility] ENDOSCOPY;  Service: Gastroenterology;  Laterality: N/A;   • CORONARY ANGIOPLASTY WITH STENT PLACEMENT     • ENDOSCOPY N/A 2021    Procedure: ESOPHAGOGASTRODUODENOSCOPY;  Surgeon: Diane Haynes MD;  Location: [facility] ENDOSCOPY;  Service: Gastroenterology;  Laterality: N/A;   • ENDOSCOPY  2022   • ENDOSCOPY N/A 2022    Procedure: ESOPHAGOGASTRODUODENOSCOPY;  Surgeon: Diane Haynes MD;  Location: [facility] ENDOSCOPY;  Service: Gastroenterology;  Laterality: N/A;   • NASAL SEPTUM SURGERY     • UPPER GASTROINTESTINAL ENDOSCOPY  2021     Family History   Problem Relation Age of Onset   • Colon cancer Paternal Grandmother      OB History        3    Para   3    Term   3            AB        Living           SAB        IAB        Ectopic        Molar        Multiple        Live Births                  Prior to Admission medications    Medication Sig Start Date End Date Taking? Authorizing Provider   aspirin 81 MG chewable tablet Chew 81 mg Daily.   Yes Provider, MD Alis   atorvastatin (LIPITOR) 10 MG tablet Take 10 mg by mouth Daily. 21  Yes  Alis Richard MD   Breelena Ellipta 100-25 MCG/INH inhaler Inhale 1 puff Daily. 4/4/22  Yes Alis Richard MD   Calcium Carb-Cholecalciferol (Calcium-Vitamin D3) 600-200 MG-UNIT tablet TAKE 1 TABLET BY MOUTH DAILY WITH A MEAL 9/8/22  Yes Alis Richard MD   cyproheptadine (PERIACTIN) 4 MG tablet Take 1 tablet by mouth Daily. 7/12/22  Yes Diane Haynes MD   denosumab (Prolia) 60 MG/ML solution prefilled syringe syringe Inject 60 mg under the skin into the appropriate area as directed Every 6 (Six) Months.   Yes Alis Richard MD   dilTIAZem (TIAZAC) 240 MG 24 hr capsule Take 240 mg by mouth Daily. 9/21/21  Yes Alis Richard MD   famotidine (PEPCID) 40 MG tablet Take 40 mg by mouth Daily. 7/5/22  Yes Alis Richard MD   HM Chest Congestion Relief 400 MG tablet TAKE ONE TABLET BY MOUTH EVERY 4 TO 6 HOURS FOR COUGH AND CONGESTION AS NEEDED FOR FOR 7 DAYS 6/3/22  Yes Alis Richard MD   isosorbide mononitrate (IMDUR) 30 MG 24 hr tablet Take 30 mg by mouth 2 (Two) Times a Day. 1/18/21  Yes Alis Richard MD   linaclotide (LINZESS) 290 MCG capsule capsule Take 1 capsule by mouth Every Morning Before Breakfast. 8/5/22  Yes Diane Haynes MD   naproxen (NAPROSYN) 500 MG tablet Take 1 tablet by mouth 2 (Two) Times a Day As Needed for Moderate Pain . Take with food 6/27/22  Yes Jackie Huynh APRN   nitroglycerin (NITROSTAT) 0.4 MG SL tablet PLACE 1 TABLET UNDER TONGUE EVERY FIVE MINUTES (MAX. OF 3) IF NO RELIEF SEEK ER 12/31/21  Yes Alis Richard MD   omeprazole (priLOSEC) 40 MG capsule Take 1 capsule by mouth Daily. 3/8/22  Yes Diane Haynes MD   ProAir  (90 Base) MCG/ACT inhaler Inhale 1 puff Every 4 (Four) Hours As Needed for Wheezing or Shortness of Air. 4/4/22  Yes Alis Richard MD     Allergies   Allergen Reactions   • Doxycycline Swelling     Face and hands swell   • Penicillins Swelling     Facial and throat swelling  "  • Duloxetine Hcl Angioedema     Social History     Socioeconomic History   • Marital status:    Tobacco Use   • Smoking status: Current Every Day Smoker     Packs/day: 1.00     Years: 42.00     Pack years: 42.00     Types: Cigarettes   • Smokeless tobacco: Never Used   • Tobacco comment: Working with program in [place] to stop smoking   Vaping Use   • Vaping Use: Former   • Substances: Nicotine   • Devices: 03/08/2022 - Patient states device was refillable.   Substance and Sexual Activity   • Alcohol use: Not Currently     Comment: Patient confirmed she consumes 2 Beer per night to aid in sleep and drinks to celebrate birthdays.   • Drug use: Never   • Sexual activity: Defer       Review of Systems  Review of Systems   Constitutional: Positive for unexpected weight change. Negative for chills, fatigue and fever.   HENT: Negative for congestion, ear discharge, hearing loss, nosebleeds and sore throat.    Eyes: Negative for pain, discharge and redness.   Respiratory: Negative for cough, chest tightness, shortness of breath and wheezing.    Cardiovascular: Negative for chest pain and palpitations.   Gastrointestinal: Positive for abdominal pain. Negative for abdominal distention, blood in stool, constipation, diarrhea, nausea and vomiting.   Endocrine: Negative for cold intolerance, polydipsia, polyphagia and polyuria.   Genitourinary: Negative for dysuria, flank pain, frequency, hematuria and urgency.   Musculoskeletal: Positive for arthralgias. Negative for back pain, joint swelling and myalgias.   Skin: Negative for color change, pallor and rash.   Neurological: Negative for tremors, seizures, syncope, weakness and headaches.   Hematological: Negative for adenopathy. Does not bruise/bleed easily.   Psychiatric/Behavioral: Negative for behavioral problems, confusion, dysphoric mood, hallucinations and suicidal ideas. The patient is not nervous/anxious.         Ht 157.5 cm (62\")   Wt 36.9 kg (81 lb 6.4 " oz)   BMI 14.89 kg/m²     Objective    Physical Exam  Constitutional:       Appearance: She is well-developed.   HENT:      Head: Normocephalic and atraumatic.   Eyes:      Conjunctiva/sclera: Conjunctivae normal.      Pupils: Pupils are equal, round, and reactive to light.   Neck:      Thyroid: No thyromegaly.   Cardiovascular:      Rate and Rhythm: Normal rate and regular rhythm.      Heart sounds: Normal heart sounds. No murmur heard.  Pulmonary:      Effort: Pulmonary effort is normal.      Breath sounds: Normal breath sounds. No wheezing.   Abdominal:      General: Bowel sounds are normal. There is no distension.      Palpations: Abdomen is soft. There is no mass.      Tenderness: There is no abdominal tenderness.      Hernia: No hernia is present.   Genitourinary:     Comments: No lesions noted  Musculoskeletal:         General: No tenderness. Normal range of motion.      Cervical back: Normal range of motion and neck supple.   Lymphadenopathy:      Cervical: No cervical adenopathy.   Skin:     General: Skin is warm and dry.      Findings: No rash.   Neurological:      Mental Status: She is alert and oriented to person, place, and time.      Cranial Nerves: No cranial nerve deficit.   Psychiatric:         Thought Content: Thought content normal.       Lab on 09/01/2022   Component Date Value Ref Range Status   • Creatinine 09/01/2022 0.45 (A) 0.57 - 1.00 mg/dL Final   • eGFR 09/01/2022 112.4  >60.0 mL/min/1.73 Final    National Kidney Foundation and American Society of Nephrology (ASN) Task Force recommended calculation based on the Chronic Kidney Disease Epidemiology Collaboration (CKD-EPI) equation refit without adjustment for race.   • BUN 09/01/2022 11  6 - 20 mg/dL Final     Assessment & Plan    No diagnosis found..   1.  Abdominal pain with constipation, well controlled.  Likely due to irritable bowel syndrome.    Continue MiraLAX 17 g p.o. daily, high-fiber diet and Linzess 290 mcg p.o. daily.  Repeat  colonoscopy in 5 years.  2.  Abdominal pain with weight loss, nausea and vomiting, improving.  Continue Prilosec 40 mg p.o. daily and Megace p.o. daily.  Refer to surgery for cholecystectomy if symptoms recur.  3.  Family history of colon cancer, patient needs high risk screening for colorectal neoplasia.    Repeat colonoscopy in 5 years.  4.  Mcneil's esophagus, continue PPI.  Antireflux lifestyle. repeat EGD  in 3 years for surveillance.  5. Nausea, improved.  Continue PPI.    6. Tobacco usage, recommend cessation.      Orders placed during this encounter include:  No orders of the defined types were placed in this encounter.      * Surgery not found *    Review and/or summary of lab tests, radiology, procedures, medications. Review and summary of old records and obtaining of history. The risks and benefits of my recommendations, as well as other treatment options were discussed with the patient today. Questions were answered.    No orders of the defined types were placed in this encounter.      Follow-up: Return in about 1 month (around 10/13/2022).               Results for orders placed or performed in visit on 09/01/22   BUN    Specimen: Blood   Result Value Ref Range    BUN 11 6 - 20 mg/dL   Creatinine, Serum    Specimen: Blood   Result Value Ref Range    Creatinine 0.45 (L) 0.57 - 1.00 mg/dL    eGFR 112.4 >60.0 mL/min/1.73   Results for orders placed or performed in visit on 08/26/22   Urinalysis With Culture If Indicated - Urine, Clean Catch    Specimen: Urine, Clean Catch   Result Value Ref Range    Color, UA Yellow Yellow, Straw    Appearance, UA Clear Clear    pH, UA 6.0 5.0 - 8.0    Specific Gravity, UA 1.009 1.005 - 1.030    Glucose, UA Negative Negative    Ketones, UA Negative Negative    Bilirubin, UA Negative Negative    Blood, UA Negative Negative    Protein, UA Negative Negative    Leuk Esterase, UA Negative Negative    Nitrite, UA Negative Negative    Urobilinogen, UA 0.2 E.U./dL 0.2 - 1.0  E.U./dL   CBC Auto Differential    Specimen: Blood   Result Value Ref Range    WBC 5.90 3.40 - 10.80 10*3/mm3    RBC 4.39 3.77 - 5.28 10*6/mm3    Hemoglobin 13.8 12.0 - 15.9 g/dL    Hematocrit 41.8 34.0 - 46.6 %    MCV 95.2 79.0 - 97.0 fL    MCH 31.4 26.6 - 33.0 pg    MCHC 33.0 31.5 - 35.7 g/dL    RDW 13.0 12.3 - 15.4 %    RDW-SD 45.5 37.0 - 54.0 fl    MPV 9.7 6.0 - 12.0 fL    Platelets 215 140 - 450 10*3/mm3    Neutrophil % 54.5 42.7 - 76.0 %    Lymphocyte % 34.4 19.6 - 45.3 %    Monocyte % 8.6 5.0 - 12.0 %    Eosinophil % 1.5 0.3 - 6.2 %    Basophil % 0.8 0.0 - 1.5 %    Immature Grans % 0.2 0.0 - 0.5 %    Neutrophils, Absolute 3.21 1.70 - 7.00 10*3/mm3    Lymphocytes, Absolute 2.03 0.70 - 3.10 10*3/mm3    Monocytes, Absolute 0.51 0.10 - 0.90 10*3/mm3    Eosinophils, Absolute 0.09 0.00 - 0.40 10*3/mm3    Basophils, Absolute 0.05 0.00 - 0.20 10*3/mm3    Immature Grans, Absolute 0.01 0.00 - 0.05 10*3/mm3    nRBC 0.0 0.0 - 0.2 /100 WBC   Results for orders placed or performed in visit on 08/24/22   TSH    Specimen: Blood   Result Value Ref Range    TSH 0.434 0.270 - 4.200 uIU/mL   Results for orders placed or performed in visit on 08/02/22   Phosphorus    Specimen: Blood   Result Value Ref Range    Phosphorus 5.9 (H) 2.5 - 4.5 mg/dL   Magnesium    Specimen: Blood   Result Value Ref Range    Magnesium 2.0 1.6 - 2.6 mg/dL   Calcium    Specimen: Blood   Result Value Ref Range    Calcium 8.9 8.6 - 10.5 mg/dL   Results for orders placed or performed in visit on 05/02/22   Phosphorus    Specimen: Blood   Result Value Ref Range    Phosphorus 3.6 2.5 - 4.5 mg/dL   Magnesium    Specimen: Blood   Result Value Ref Range    Magnesium 1.5 (L) 1.6 - 2.6 mg/dL   Comprehensive metabolic panel    Specimen: Blood   Result Value Ref Range    Glucose 117 (H) 65 - 99 mg/dL    BUN 8 6 - 20 mg/dL    Creatinine 0.47 (L) 0.57 - 1.00 mg/dL    Sodium 142 136 - 145 mmol/L    Potassium 3.4 (L) 3.5 - 5.2 mmol/L    Chloride 111 (H) 98 - 107 mmol/L     CO2 21.0 (L) 22.0 - 29.0 mmol/L    Calcium 8.8 8.6 - 10.5 mg/dL    Total Protein 6.5 6.0 - 8.5 g/dL    Albumin 4.00 3.50 - 5.20 g/dL    ALT (SGPT) 49 (H) 1 - 33 U/L    AST (SGOT) 80 (H) 1 - 32 U/L    Alkaline Phosphatase 96 39 - 117 U/L    Total Bilirubin 0.2 0.0 - 1.2 mg/dL    Globulin 2.5 gm/dL    A/G Ratio 1.6 g/dL    BUN/Creatinine Ratio 17.0 7.0 - 25.0    Anion Gap 10.0 5.0 - 15.0 mmol/L    eGFR 111.2 >60.0 mL/min/1.73   Results for orders placed or performed during the hospital encounter of 04/22/22   Tissue Pathology Exam    Specimen: A: Gastric, Antrum; Tissue    B: Esophagus; Tissue   Result Value Ref Range    Case Report       Surgical Pathology Report                         Case: [id number]                                  Authorizing Provider:  Diane Haynes MD      Collected:           04/22/2022 01:02 PM          Ordering Location:     [facility]   Received:            04/25/2022 07:59 AM                                 [facility]                                                     Pathologist:           Kenji Guzmán MD                                                             Specimens:   1) - Gastric, Antrum, KG                                                                            2) - Esophagus, EGJ         KG                                                             Final Diagnosis       See Scanned Report       Results for orders placed or performed in visit on 04/19/22   COVID-19, [facility] IN-HOUSE, NP SWAB IN TRANSPORT MEDIA 8-10 HR TAT - Swab, Nasopharynx    Specimen: Nasopharynx; Swab   Result Value Ref Range    COVID19 Not Detected Not Detected - Ref. Range   Results for orders placed or performed in visit on 02/01/22   Phosphorus    Specimen: Blood   Result Value Ref Range    Phosphorus 5.1 (H) 2.5 - 4.5 mg/dL   Magnesium    Specimen: Blood   Result Value Ref Range    Magnesium 1.7 1.6 - 2.6 mg/dL   Comprehensive Metabolic Panel    Specimen: Blood    Result Value Ref Range    Glucose 130 (H) 65 - 99 mg/dL    BUN 8 6 - 20 mg/dL    Creatinine 0.66 0.57 - 1.00 mg/dL    Sodium 141 136 - 145 mmol/L    Potassium 4.0 3.5 - 5.2 mmol/L    Chloride 104 98 - 107 mmol/L    CO2 26.0 22.0 - 29.0 mmol/L    Calcium 9.5 8.6 - 10.5 mg/dL    Total Protein 6.8 6.0 - 8.5 g/dL    Albumin 4.00 3.50 - 5.20 g/dL    ALT (SGPT) 22 1 - 33 U/L    AST (SGOT) 36 (H) 1 - 32 U/L    Alkaline Phosphatase 144 (H) 39 - 117 U/L    Total Bilirubin 0.4 0.0 - 1.2 mg/dL    eGFR Non African Amer 93 >60 mL/min/1.73    Globulin 2.8 gm/dL    A/G Ratio 1.4 g/dL    BUN/Creatinine Ratio 12.1 7.0 - 25.0    Anion Gap 11.0 5.0 - 15.0 mmol/L   Results for orders placed or performed in visit on 12/16/21   Phosphorus    Specimen: Blood   Result Value Ref Range    Phosphorus 2.9 2.5 - 4.5 mg/dL   Magnesium    Specimen: Blood   Result Value Ref Range    Magnesium 1.7 1.6 - 2.6 mg/dL   Comprehensive metabolic panel    Specimen: Blood   Result Value Ref Range    Glucose 105 (H) 65 - 99 mg/dL    BUN 8 6 - 20 mg/dL    Creatinine 0.51 (L) 0.57 - 1.00 mg/dL    Sodium 139 136 - 145 mmol/L    Potassium 3.6 3.5 - 5.2 mmol/L    Chloride 103 98 - 107 mmol/L    CO2 28.0 22.0 - 29.0 mmol/L    Calcium 8.7 8.6 - 10.5 mg/dL    Total Protein 6.5 6.0 - 8.5 g/dL    Albumin 4.20 3.50 - 5.20 g/dL    ALT (SGPT) 18 1 - 33 U/L    AST (SGOT) 28 1 - 32 U/L    Alkaline Phosphatase 122 (H) 39 - 117 U/L    Total Bilirubin 0.4 0.0 - 1.2 mg/dL    eGFR Non African Amer 125 >60 mL/min/1.73    Globulin 2.3 gm/dL    A/G Ratio 1.8 g/dL    BUN/Creatinine Ratio 15.7 7.0 - 25.0    Anion Gap 8.0 5.0 - 15.0 mmol/L     *Note: Due to a large number of results and/or encounters for the requested time period, some results have not been displayed. A complete set of results can be found in Results Review.         This document has been electronically signed by Diane Haynes MD on October 1, 2022 15:14 CDT

## 2023-01-27 DIAGNOSIS — M81.0 SENILE OSTEOPOROSIS: Primary | ICD-10-CM

## 2023-02-17 ENCOUNTER — LAB (OUTPATIENT)
Dept: LAB | Facility: HOSPITAL | Age: 58
End: 2023-02-17
Payer: MEDICARE

## 2023-02-17 DIAGNOSIS — M81.0 SENILE OSTEOPOROSIS: ICD-10-CM

## 2023-02-17 LAB
25(OH)D3 SERPL-MCNC: 20.9 NG/ML (ref 30–100)
CALCIUM SPEC-SCNC: 8.7 MG/DL (ref 8.6–10.5)

## 2023-02-17 PROCEDURE — 82306 VITAMIN D 25 HYDROXY: CPT

## 2023-02-17 PROCEDURE — 82310 ASSAY OF CALCIUM: CPT

## 2023-02-21 ENCOUNTER — INFUSION (OUTPATIENT)
Dept: ONCOLOGY | Facility: HOSPITAL | Age: 58
End: 2023-02-21
Payer: MEDICARE

## 2023-02-21 VITALS — TEMPERATURE: 97.7 F | SYSTOLIC BLOOD PRESSURE: 141 MMHG | DIASTOLIC BLOOD PRESSURE: 82 MMHG | HEART RATE: 106 BPM

## 2023-02-21 DIAGNOSIS — M81.0 SENILE OSTEOPOROSIS: Primary | ICD-10-CM

## 2023-02-21 PROCEDURE — 25010000002 DENOSUMAB 60 MG/ML SOLUTION PREFILLED SYRINGE: Performed by: NURSE PRACTITIONER

## 2023-02-21 PROCEDURE — 96372 THER/PROPH/DIAG INJ SC/IM: CPT | Performed by: NURSE PRACTITIONER

## 2023-02-21 RX ADMIN — DENOSUMAB 60 MG: 60 INJECTION SUBCUTANEOUS at 09:34

## 2023-02-28 ENCOUNTER — TELEPHONE (OUTPATIENT)
Dept: ORTHOPEDIC SURGERY | Facility: CLINIC | Age: 58
End: 2023-02-28
Payer: MEDICARE

## 2023-02-28 NOTE — PROGRESS NOTES
Please notify the patient that her vitamin D level is too low.  I would recommend that we start her on a vitamin D supplement to help correct this.  It is important to correct vitamin D deficiency for proper calcium absorption, which will help to keep her bones strong.  Please verify what pharmacy she would like the vitamin D supplement sent to.  Thanks.

## 2023-02-28 NOTE — TELEPHONE ENCOUNTER
----- Message from FREDI Villegas sent at 2/27/2023  8:43 PM CST -----  Please notify the patient that her vitamin D level is too low.  I would recommend that we start her on a vitamin D supplement to help correct this.  It is important to correct vitamin D deficiency for proper calcium absorption, which will help to [id number]  p her bones strong.  Please verify what pharmacy she would like the vitamin D supplement sent to.  Thanks.

## 2023-08-16 DIAGNOSIS — M81.0 SENILE OSTEOPOROSIS: Primary | ICD-10-CM

## 2023-08-17 ENCOUNTER — LAB (OUTPATIENT)
Dept: LAB | Facility: HOSPITAL | Age: 58
End: 2023-08-17
Payer: MEDICARE

## 2023-08-17 DIAGNOSIS — M81.0 SENILE OSTEOPOROSIS: ICD-10-CM

## 2023-08-17 LAB — CALCIUM SPEC-SCNC: 9.9 MG/DL (ref 8.6–10.5)

## 2023-08-17 PROCEDURE — 82310 ASSAY OF CALCIUM: CPT

## 2023-08-23 ENCOUNTER — INFUSION (OUTPATIENT)
Dept: ONCOLOGY | Facility: HOSPITAL | Age: 58
End: 2023-08-23
Payer: MEDICARE

## 2023-08-23 VITALS
HEART RATE: 89 BPM | SYSTOLIC BLOOD PRESSURE: 157 MMHG | TEMPERATURE: 96.7 F | DIASTOLIC BLOOD PRESSURE: 72 MMHG | RESPIRATION RATE: 18 BRPM

## 2023-08-23 DIAGNOSIS — M81.0 SENILE OSTEOPOROSIS: Primary | ICD-10-CM

## 2023-08-23 PROCEDURE — 25010000002 DENOSUMAB 60 MG/ML SOLUTION PREFILLED SYRINGE: Performed by: NURSE PRACTITIONER

## 2023-08-23 RX ADMIN — DENOSUMAB 60 MG: 60 INJECTION SUBCUTANEOUS at 11:00

## (undated) DEVICE — CANN SMPL SOFTECH BIFLO ETCO2 A/M 7FT

## (undated) DEVICE — SINGLE-USE BIOPSY FORCEPS: Brand: RADIAL JAW 4

## (undated) DEVICE — BITEBLOCK ENDO W/STRAP 60F A/ LF DISP